# Patient Record
Sex: FEMALE
[De-identification: names, ages, dates, MRNs, and addresses within clinical notes are randomized per-mention and may not be internally consistent; named-entity substitution may affect disease eponyms.]

---

## 2017-03-24 ENCOUNTER — HOSPITAL ENCOUNTER (EMERGENCY)
Dept: HOSPITAL 42 - ED | Age: 67
Discharge: HOME | End: 2017-03-24
Payer: COMMERCIAL

## 2017-03-24 VITALS — TEMPERATURE: 97.4 F

## 2017-03-24 VITALS — HEART RATE: 90 BPM | DIASTOLIC BLOOD PRESSURE: 70 MMHG | SYSTOLIC BLOOD PRESSURE: 130 MMHG

## 2017-03-24 VITALS — RESPIRATION RATE: 16 BRPM | OXYGEN SATURATION: 98 %

## 2017-03-24 VITALS — BODY MASS INDEX: 42 KG/M2

## 2017-03-24 DIAGNOSIS — Z23: ICD-10-CM
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
## 2017-03-24 DIAGNOSIS — S01.01XA: Primary | ICD-10-CM
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
## 2017-03-24 DIAGNOSIS — E11.9: ICD-10-CM

## 2017-03-24 NOTE — ED PDOC
Arrival/HPI





- General


Chief Complaint: Trauma


Time Seen by Provider: 17 19:45


Historian: Patient





- History of Present Illness


Narrative History of Present Illness (Text): 


17 21:15


66 year old female on aspirin presents to the Emergency department s/p 

mechanical fall 4 hours PTA. Patient reports she tripped and fell, hitting the 

back of her head. She notes posterior head laceration. She denies loss of 

consciousness, vomiting, or headache. 








Time/Duration: 4-6 hours


Symptom Onset: Sudden


Activities at Onset: Rest


Context: Tripped





Past Medical History





- Provider Review


Nursing Documentation Reviewed: Yes





- Infectious Disease


Hx of Infectious Diseases: None





- Tetanus Immunization


Tetanus Immunization: Unknown





- Cardiac


Hx Cardiac Disorders: No


Hx Pacemaker: No





- Pulmonary


Hx Respiratory Disorders: No





- Neurological


Hx Neurological Disorder: No


Hx Paralysis: No





- HEENT


Hx HEENT Disorder: No





- Renal


Hx Renal Disorder: No





- Endocrine/Metabolic


Hx Endocrine Disorders: Yes


Hx Diabetes Mellitus Type 2: Yes





- Hematological/Oncological


Hx Blood Disorders: No


Hx Blood Transfusions: No


Hx Blood Transfusion Reaction: No


Hx Cancer: Yes (history of left breast)





- Integumentary


Hx Dermatological Disorder: No





- Musculoskeletal/Rheumatological


Hx Musculoskeletal Disorders: Yes (RA)





- Gastrointestinal


Hx Gastrointestinal Disorders: Yes


Hx Gastroesophageal Reflux: Yes





- Genitourinary/Gynecological


Hx Genitourinary Disorders: Yes


Other/Comment: breast ca





- Psychiatric


Hx Anxiety: Yes


Hx Depression: Yes


Hx Substance Use: No





- Surgical History


Hx Appendectomy: Yes


Hx  Section: Yes


Hx Musculoskeletal Surgery: Yes (left shoulder tumor removal)


Other/Comment: left breast lumpectomy





- Anesthesia


Hx Anesthesia Reactions: No


Hx Malignant Hyperthermia: No





- Suicidal Assessment


Feels Threatened In Home Enviroment: No





Family/Social History





- Physician Review


Nursing Documentation Reviewed: Yes


Family/Social History: Unknown Family HX


Smoking Status: Never Smoked


Hx Alcohol Use: No


Hx Substance Use: No


Hx Substance Use Treatment: No





Allergies/Home Meds


Allergies/Adverse Reactions: 


Allergies





ciprofloxacin [From Cipro] Adverse Reaction (Verified 17 19:10)


 VOMITING


ciprofloxacin HCl [From Cipro] Adverse Reaction (Verified 17 19:10)


 VOMITING


codeine Adverse Reaction (Verified 17 19:10)


 VOMITING








Home Medications: 


 Home Meds











 Medication  Instructions  Recorded  Confirmed


 


Aspirin [Aspir 81] 81 mg PO DAILY 14


 


Celecoxib [Celebrex] 200 mg PO DAILY 14


 


DULoxetine [Cymbalta] 60 mg PO BID 14


 


Etanercept [Enbrel] 50 mg PO WED 14


 


Folic Acid 1 mg PO DAILY 14


 


Lorazepam [Ativan] 1 mg PO TID 14


 


Rosuvastatin Calcium [Crestor] 10 mg PO QAM 14


 


Vitamin D 1,000 iu PO QPM 14


 


Evening Primrose Oil 2 tab PO HS 11/23/15 03/24/17


 


Glipizide [Glipizide ER] 2.5 mg PO QAM 11/23/15 03/24/17


 


Methotrexate 2.5 mg PO WED 11/23/15 03/24/17


 


Ranitidine HCl [Zantac 150] 150 mg PO BID 16














Physical Exam





- Physical Exam


Narrative Physical Exam (Text): 


17 21:21


- Review of Systems





Constitutional: Normal.  absent: LOC, Fatigue, Weight Change, Fevers


Eyes: Normal


ENT: Normal


Respiratory: Normal  absent: SOB, Cough, Sputum


Cardiovascular: Normal absent: Chest pain, Palpitations, Syncope


Gastrointestinal: Normal absent: Abdominal pain, Diarrhea, Nausea, Vomiting


Genitourinary: Normal.  absent: Dysuria, Frequency, Hematuria


Musculoskeletal: Normal.  absent: Arthralgias, Back Pain, Neck Pain


Skin: laceration to posterior head


Neurological: Normal absent: Headache, Focal Weakness


Endocrine: Normal


Hemo/Lymphatic: Normal


Psychiatric: Normal





- Physical exam





1.5 cm posterior occipital laceration, no active bleeding, 4mm in depth. No 

nasal bone deformity or tenderness, no facial or jaw pain/swelling. No neck 

midline tenderness, thoracic and lumbar spine with no midline tenderness. Pt 

moving b/l upper and lower extremities without difficulty, 5/5 strength, with 

full active and passive ROM. Distal neurovasc fully intact. Abd soft/nt/ng, no 

hematomas, no peritoneal signs. Neg. pelvic rock. 





- Systems Exam





Pupils: Present: PERRL


Extroacular Muscles: Present: EOMI


Conjunctiva: Present: Normal


Mouth: Present: Moist Mucous Membranes


Neck: Present: Normal Range of Motion.  No: MIDLINE TENDERNESS, Paraspinal 

Tenderness


Respiratory/Chest: Present: Clear to Auscultation, Good Air Exchange.  No: 

Respiratory Distress, Accessory Muscle Use, Tachypneic


Cardiovascular: Present: Regular Rate and Rhythm, Normal S1, S2, Peripheal 

Pulses Present.  No: Murmurs


Abdomen: Present: Normal Bowel Sounds.  No: Tenderness, Distention, Peritoneal 

Signs, Rebound, Guarding


Back: Present: Normal Inspection.  No: Midline Tenderness, Paraspinal Tenderness


Upper Extremity: Present: Normal Inspection.  No: Cyanosis, Edema


Lower Extremity: Present: Normal Inspection.  No: Edema


Neurological: Present: GCS=15, Speech Normal, cranial nerves II through XII 

fully intact with no cerebellar abnormality, neurosensory fully intact. No 

focal neurological deficits.


Skin: Present: Warm, Dry, Normal Color.  No: Rashes


Lymphatic: Present: OX3, NI, NC


Psychiatric: Present: Alert, Oriented x 3, Normal Insight, Normal Concentration


Vital Signs Reviewed: Yes


Vital Signs











  Temp Pulse Resp BP Pulse Ox


 


 17 19:13  97.4 F L  104 H  19  108/73  99











Temperature: Afebrile


Blood Pressure: Normal


Pulse: Regular


Respiratory Rate: Normal


Appearance: Positive for: Well-Appearing, Non-Toxic, Comfortable


Pain Distress: None


Mental Status: Positive for: Alert and Oriented X 3


Finger Stick Blood Glucose: 75





Medical Decision Making


ED Course and Treatment: 


17 21:23


Impression:


66 year old female s/p mechanical fall. Physical exam revealed 1.5 cm posterior 

occipital laceration, no active bleeding, 4mm in depth. 





Plan:


--CT Head


--TDAP Vaccine


-- Reassess and disposition





Progress Notes:








17 21:26


PROCEDURE: LACERATION REPAIR


Performed by the emergency provider


Location: posterior occipital


Length: 1.5 cm


Description: clean wound edges, no foreign bodies


Distal CMS: Normal. No deficits. Neurovascularly intact.


Preparation: The wound was cleaned and the area was prepped and draped in the 

usual sterile fashion.


Exploration: The wound was explored and no foreign bodies were found.


Procedure: The wound was closed with staples. There was good approximation.


Post-Procedure: Good closure and hemostasis. The patient tolerated the 

procedure well and there were no complications. 





CT Head pending. Patient in no distress.





EKG:


Ordered, reviewed, and independently interpreted the EKG.


Rate :  67 BPM


Rhythm : NSR


Interpretation : No ST-segment elevations, normal axis, normal intervals. 

Interpreted by me.


Comparison : No previous EKG for comparison.








17 21:36


IMPRESSION:


No acute intracranial findings. Right suboccipital scalp hematoma/contusion 

with extension to the


occipital scalp with minimal subcutaneous gas which is thought to relate to a 

left midline occipital


laceration.


Dictated and Authenticated by: Jeramie Meyer MD





Patient's has no focal neurological deficits on reevaluation. She denies having 

a headache. States that she feels comfortable being discharged home with 

outpatient follow-up.





Pt states she understands to return to the ER right away for new or worsening 

symptoms or for inability to f/u with PMD or specialist as instructed. 





Patient states that she fully agrees with and understands discharge 

instructions. States that she agrees with the plan and disposition. Verbalized 

and repeated discharge instructions and plan. I have given the patient 

opportunity to ask any additional questions.





- RAD Interpretation


Radiology Orders: 








17 19:46


HEAD W/O CONTRAST [CT] Stat 














- Medication Orders


Current Medication Orders: 











Discontinued Medications





Tetanus/Reduced Diphtheria/Acell Pertussis (Boostrix Vaccine Inj)  0.5 ml IM 

.ONCE ONE


   Stop: 17 19:46


   Last Admin: 17 19:56  Dose: 0.5 ML





MAR Immunization Data


 Document     17 19:56  SF  (Rec: 17 19:57  SF  [facility])


     Immunization Data


      Vaccine Lot Number                         [id number]


      Vaccine Expiration Date                    19


      Site Given                                 Left Deltoid


      Route                                      Intramuscular














- Scribe Statement


The provider has reviewed the documentation as recorded by the Scribe


Alem Milton





Provider Scribe Attestation:


All medical record entries made by the Scribe were at my direction and 

personally dictated by me. I have reviewed the chart and agree that the record 

accurately reflects my personal performance of the history, physical exam, 

medical decision making, and the department course for this patient. I have 

also personally directed, reviewed, and agree with the discharge instructions 

and disposition.








Disposition/Present on Arrival





- Present on Arrival


Any Indicators Present on Arrival: No


History of DVT/PE: No


History of Uncontrolled Diabetes: Yes


Urinary Catheter: No


History of Decub. Ulcer: No


History Surgical Site Infection Following: None





- Disposition


Have Diagnosis and Disposition been Completed?: Yes


Diagnosis: 


 Head injury


Disposition: HOME/ ROUTINE


Disposition Time: 21:38


Patient Plan: Discharge


Condition: GOOD


Discharge Instructions (ExitCare):  Head Injury (ED), Fall Prevention for Older 

Adults (ED)


Additional Instructions: 


PLEASE RETURN TO THE EMERGENCY DEPARTMENT FOR NEW OR WORSENING SYMPTOMS. RETURN 

RIGHT AWAY IF YOU CANNOT FOLLOW UP WITH YOUR PRIMARY CARE DOCTOR, CLINIC, OR 

SPECIALIST IN 1-2 DAYS. 


PLEASE RETURN TO THE ER OR YOUR PRIMARY PHYSICIAN IN 7 DAYS FOR STAPLE REMOVAL


KEEP WOUND CLEAN AND DRY


APPLY ANTI-BACTERIAL OINTMENT TWICE A DAY (over the counter per pharmacy 

instructions)


Please take over-the-counter Tylenol for pain

## 2017-03-25 NOTE — CT
PROCEDURE:  CT HEAD WITHOUT CONTRAST.



HISTORY:

Fall



COMPARISON:

None available. 



TECHNIQUE:

Axial computed tomography images were obtained through the head/brain 

without intravenous contrast.  



Radiation dose:



Total exam DLP = 725.84 mGy-cm.



FINDINGS:



HEMORRHAGE:

No intracranial hemorrhage. 



BRAIN:

There are mild chronic microangiopathic changes.  There is no mass, 

mass effect or abnormal extra-axial fluid collection.  There is 

normal density in the larger dural venous sinuses. 



VENTRICLES:

There is mild age-related global parenchymal volume loss and 

proportionate enlargement of the ventricles and cortical sulci. 



CALVARIUM:

There is no calvarial fracture. There is a moderate right 

parieto-occipital scalp hematoma and left parietal laceration. . 



PARANASAL SINUSES:

Predominantly clear.



MASTOID AIR CELLS:

Predominantly clear.



OTHER FINDINGS:

None.



IMPRESSION:

No acute intracranial abnormality. 



Moderate right parieto-occipital scalp hematoma. 



A preliminary report was provided by [facility] services.

## 2017-04-01 ENCOUNTER — HOSPITAL ENCOUNTER (EMERGENCY)
Dept: HOSPITAL 42 - ED | Age: 67
Discharge: HOME | End: 2017-04-01
Payer: MEDICARE

## 2017-04-01 VITALS — RESPIRATION RATE: 16 BRPM | OXYGEN SATURATION: 98 %

## 2017-04-01 VITALS — HEART RATE: 109 BPM | SYSTOLIC BLOOD PRESSURE: 135 MMHG | TEMPERATURE: 97.9 F | DIASTOLIC BLOOD PRESSURE: 80 MMHG

## 2017-04-01 VITALS — BODY MASS INDEX: 38.5 KG/M2
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
## 2017-04-01 DIAGNOSIS — S62.92XA: Primary | ICD-10-CM

## 2017-04-01 DIAGNOSIS — E11.9: ICD-10-CM

## 2017-04-01 NOTE — ED PDOC
Arrival/HPI





- General


Time Seen by Provider: 17 18:22


Historian: Patient





- History of Present Illness


Narrative History of Present Illness (Text): 





17 18:22


Patient reports injuring her L hand when she fell 6 days ago. Presents to the 

ER today due to continued pain, swelling and bruising. Otherwise:  (-) other 

injury, (-) numbness.  





SUSANA Gallo





Past Medical History





- Provider Review


Nursing Documentation Reviewed: Yes





- Infectious Disease


Hx of Infectious Diseases: None





- Tetanus Immunization


Tetanus Immunization: Unknown





- Cardiac


Hx Cardiac Disorders: No


Hx Pacemaker: No





- Pulmonary


Hx Respiratory Disorders: No





- Neurological


Hx Neurological Disorder: No


Hx Paralysis: No





- HEENT


Hx HEENT Disorder: No





- Renal


Hx Renal Disorder: No





- Endocrine/Metabolic


Hx Endocrine Disorders: Yes


Hx Diabetes Mellitus Type 2: Yes





- Hematological/Oncological


Hx Blood Disorders: No


Hx Blood Transfusions: No


Hx Blood Transfusion Reaction: No


Hx Cancer: Yes (history of left breast)





- Integumentary


Hx Dermatological Disorder: No





- Musculoskeletal/Rheumatological


Hx Musculoskeletal Disorders: Yes (RA)





- Gastrointestinal


Hx Gastrointestinal Disorders: Yes


Hx Gastroesophageal Reflux: Yes





- Genitourinary/Gynecological


Hx Genitourinary Disorders: Yes


Other/Comment: breast ca





- Psychiatric


Hx Anxiety: Yes


Hx Depression: Yes


Hx Substance Use: No





- Surgical History


Hx Appendectomy: Yes


Hx  Section: Yes


Hx Musculoskeletal Surgery: Yes (left shoulder tumor removal)


Other/Comment: left breast lumpectomy





- Anesthesia


Hx Anesthesia Reactions: No


Hx Malignant Hyperthermia: No





- Suicidal Assessment


Feels Threatened In Home Enviroment: No





Family/Social History





- Physician Review


Nursing Documentation Reviewed: Yes


Family/Social History: No Known Family HX


Smoking Status: Never Smoked


Hx Alcohol Use: No


Hx Substance Use: No


Hx Substance Use Treatment: No





Allergies/Home Meds


Allergies/Adverse Reactions: 


Allergies





ciprofloxacin [From Cipro] Adverse Reaction (Verified 17 18:33)


 VOMITING


codeine Adverse Reaction (Verified 17 18:33)


 VOMITING








Home Medications: 


 Home Meds











 Medication  Instructions  Recorded  Confirmed


 


Aspirin [Aspir 81] 81 mg PO DAILY 14


 


Celecoxib [Celebrex] 200 mg PO DAILY 14


 


DULoxetine [Cymbalta] 60 mg PO BID 14


 


Etanercept [Enbrel] 50 mg PO WED 14


 


Folic Acid 1 mg PO DAILY 14


 


Lorazepam [Ativan] 1 mg PO TID 14


 


Rosuvastatin Calcium [Crestor] 10 mg PO QAM 14


 


Vitamin D 1,000 iu PO QPM 14


 


Evening Primrose Oil 2 tab PO HS 11/23/15 03/24/17


 


Glipizide [Glipizide ER] 2.5 mg PO QAM 11/23/15 03/24/17


 


Methotrexate 2.5 mg PO WED 11/23/15 03/24/17


 


Ranitidine HCl [Zantac 150] 150 mg PO BID 16














Review of Systems





- Review of Systems


Constitutional: Normal.  absent: Fatigue, Fevers


Musculoskeletal: Normal, Arthralgias.  absent: Back Pain


Skin: Normal.  absent: Rash, Pruritis, Skin Lesions





Physical Exam





- Physical Exam


Narrative Physical Exam (Text): 





17 18:24


GENERAL APPEARANCE: Patient is awake, alert, oriented x 3, in no acute 

distress. 


SKIN: Warm, (-) rash, (-) lesions. 


UPPER EXTREMITY:  (+) Mild tenderness, swelling, and ecchymosis of  dorsal 

aspect of the L hand over the 3rd-4th-5th metacarpal; (-) crepitus, (-) 

deformity.  Tendon function intact.  (-) distal neurovascular deficit.  2 point 

discrimination.  Remainder of hand, digits and wrist:  (-) injury.


Vital Signs











  Temp Pulse Resp BP Pulse Ox


 


 17 18:27  97.9 F  109 H  20  135/80  94 L














Medical Decision Making


ED Course and Treatment: 


17 18:24


67 yo F presents with L hand injury 6 days ago. XR L hand ordered. Patient is 

refusing any analgesic medications at this time. 





XR L hand: fracture to the 5th metacarpal head, no dislocation, as read by PA


Patient advised that official radiology read of XR is still pending and will 

call the patient if there is any discrepancy within 24 hours.  





XR results discussed with the patient in great detail. Orthoglass ulnar gutter 

splint applied to the hand by PA, neurovascular intact post splint application. 

Based on history, exam and diagnostic results plan will be for outpatient follow

-up. Patient states she fully agrees with and understands discharge 

instructions. States that she agrees with the plan and disposition. Verbalized 

and repeated discharge instructions and plan. I have given the patient 

opportunity to ask any additional questions. 





Follow up with ortho and plastics referral in 1-2 days without fail. Return to 

the emergency room at any time for any new or worsening symptoms.











- RAD Interpretation


Radiology Orders: 








17 18:33


HAND LEFT 3 VIEWS ROUTINE [RAD] Stat 














- PA / NP / Resident Statement


MD/DO has reviewed & agrees with the documentation as recorded.





Disposition/Present on Arrival





- Present on Arrival


Any Indicators Present on Arrival: Yes


History of DVT/PE: No


History of Uncontrolled Diabetes: Yes


Urinary Catheter: No


History Surgical Site Infection Following: None





- Disposition


Have Diagnosis and Disposition been Completed?: Yes


Diagnosis: 


 Hand fracture, left


Disposition: HOME/ ROUTINE


Disposition Time: 19:00


Patient Plan: Discharge


Condition: STABLE


Discharge Instructions (ExitCare):  Hand Fracture (ED)


Print Language: ENGLISH


Additional Instructions: 


Thank you for letting us take care of you today. You were treated for L hand 

fracture. The emergency medical care you received today was directed at your 

acute symptoms. Return to the Emergency Department if your symptoms worsen, do 

not improve, or if you have any other problems.





Please contact your doctor in 2 days for re-evaluation and follow up / or call 

one of the physicians/clinics you have been referred to that are listed on the 

Patient Visit Information form that is included in your discharge packet. Bring 

any paperwork you were given at discharge with you along with any medications 

you are taking to your follow up visit. Our treatment cannot replace ongoing 

medical care by a primary care provider (PCP) outside of the emergency 

department.





Thank you for allowing the [facility] team to be part of your care today.








Referrals: 


Ryan Gallo MD [Primary Care Provider] - Follow up with primary


Jeramie Vazquez III, MD [Medical Doctor] - Follow up with primary


Azeem Arriaga MD [Staff Provider] - Follow up with primary

## 2017-04-02 NOTE — RAD
PROCEDURE:  Left Hand Radiographs.



HISTORY:

 pain 



COMPARISON:

None.



FINDINGS:



BONES:

Normal. No fracture. 



JOINTS:

Normal. No osteoarthritic changes. 



SOFT TISSUES:

Normal. 



OTHER FINDINGS:

None.



IMPRESSION:

Normal left hand radiographs.

## 2018-02-13 ENCOUNTER — HOSPITAL ENCOUNTER (OUTPATIENT)
Dept: HOSPITAL 42 - SDS | Age: 68
Discharge: HOME | End: 2018-02-13
Attending: ORTHOPAEDIC SURGERY
Payer: MEDICARE

## 2018-02-13 VITALS — BODY MASS INDEX: 39.9 KG/M2

## 2018-02-13 VITALS — TEMPERATURE: 98.2 F | DIASTOLIC BLOOD PRESSURE: 68 MMHG | SYSTOLIC BLOOD PRESSURE: 119 MMHG | HEART RATE: 98 BPM

## 2018-02-13 VITALS — OXYGEN SATURATION: 96 %

## 2018-02-13 VITALS — RESPIRATION RATE: 18 BRPM

## 2018-02-13 DIAGNOSIS — M75.42: ICD-10-CM

## 2018-02-13 DIAGNOSIS — M75.102: Primary | ICD-10-CM

## 2018-02-13 DIAGNOSIS — S46.212A: ICD-10-CM

## 2018-02-13 LAB
AMORPH SED URNS QL MICRO: (no result)
APPEARANCE UR: (no result)
BACTERIA #/AREA URNS HPF: (no result) /[HPF]
BILIRUB UR-MCNC: NEGATIVE MG/DL
COLOR UR: YELLOW
GLUCOSE UR STRIP-MCNC: NEGATIVE MG/DL
LEUKOCYTE ESTERASE UR-ACNC: (no result) LEU/UL
PH UR STRIP: 5.5 [PH]
PROT UR STRIP-MCNC: NEGATIVE MG/DL
RBC # UR STRIP: NEGATIVE /UL
RBC #/AREA URNS HPF: (no result) /HPF
SP GR UR STRIP: 1.02
URINE NITRATE: POSITIVE
UROBILINOGEN UR STRIP-ACNC: 0.2 E.U./DL
WBC #/AREA URNS HPF: (no result) /HPF

## 2018-02-13 PROCEDURE — 29826 SHO ARTHRS SRG DECOMPRESSION: CPT

## 2018-02-13 PROCEDURE — 29827 SHO ARTHRS SRG RT8TR CUF RPR: CPT

## 2018-02-13 PROCEDURE — 29828 SHO ARTHRS SRG BICP TENODSIS: CPT

## 2018-02-13 PROCEDURE — 82948 REAGENT STRIP/BLOOD GLUCOSE: CPT

## 2018-02-13 PROCEDURE — 81001 URINALYSIS AUTO W/SCOPE: CPT

## 2018-02-13 NOTE — OP
PROCEDURE DATE:  02/13/2018



PREOPERATIVE DIAGNOSES:  Left shoulder rotator cuff tear and impingement.



POSTOPERATIVE DIAGNOSES:  Left shoulder rotator cuff tear, impingement and

biceps tendon tear.



PROCEDURE:  Left shoulder arthroscopy with arthroscopic rotator cuff

repair, subacromial decompression and biceps tenotomy.



SURGEON:  Brown Gamboa Md.



ASSISTANT:  Dr. Gamboa was assisted by Bibi Lopez, physician

assistant.  Ms. Lopez was scrubbed and present throughout the entire case

and helped with patient positioning, holding the arthroscope during the

repair as well as wound closure.



ANESTHESIA:  General.



COMPLICATIONS:  None.



ESTIMATED BLOOD LOSS:  10 mL.



OPERATIVE INDICATIONS:  This is a 67-year-old female, who presented with

complaints of left shoulder pain.  Clinical examination was consistent with

pain with resisted abduction and forward flexion, positive Almeida sign. 

Patient was noted to have some weakness with forward flexion.  CT with

intraarticular contrast examination was consistent with what looked like a

near to full thickness tear of the supraspinatus.  After a period of failed

nonsurgical management, recommendation was for a left shoulder arthroscopy.

The risks, benefits, and alternatives of the procedure were discussed with

the patient and informed consent was obtained.



OPERATIVE PROCEDURE:  After the surgical site was signed and verified in

the preoperative holding area, the patient was taken to the operating room

and placed supine on the operating room table.  After the patient received

1 g of Rocephin IV, patient was positioned in the lateral decubitus

position with the left shoulder up towards the ceiling.  Care was taken to

make sure all bony prominences and nerves were well padded and protected. 

An axillary roll was placed in the right axilla.  Venodyne boots were

placed on the bilateral lower extremities and the left upper extremity was

prepped and draped in the usual sterile fashion.  Bony landmarks were

identified about the left upper extremity and the portal sites were

injected with a total of 10 mL of 1% lidocaine with epi.  Posterior

arthroscopy portal was established and arthroscope was inserted into the

glenohumeral joint.  The chondral surface of the glenohumeral head were

noted to have some grade I changes.  The biceps tendon was also visualized

and appeared to have at least a 50% tear with some fraying of the biceps

noted distally at the level of the intertubercular groove.  At this point,

decision was made to perform a biceps tenotomy and this was done using the

electrocautery device and full radius shaver.  The anterior, inferior and

posterior labrum appeared to be intact.  No loose bodies were appreciated. 

The supraspinatus was noted to have significant articular sided fraying and

this was debrided using a full radius shaver.  Subscapularis was identified

and appeared to be intact.  At this point, the arthroscope was inserted

posteriorly into the subacromial space.  A lateral port was established and

a bursectomy was performed, which gave excellent visualization of the

undersurface of the acromion as well as the rotator cuff.  Satisfied that

the subacromial space have been adequately decompressed, our attention was

directed to the rotator cuff.  _____ a small, but full thickness tear at

the midsubstance of the supraspinatus tendon with some extension into

almost the footprint of the tear.  Decision was made to do margin

convergence repair and this was done by passing 4 sutures across the tear

and then tying it down arthroscopically.  Once this was done, the edges of

the tear were noted to be firmly apposed and the rotator cuff footprint

appeared to be intact.  At this point, all the instruments were removed and

all the portal sites were closed using interrupted 2-0 Vicryl suture and

3-0 nylon.  A sterile dressing was applied.  The left upper extremity was

_____ and placed in a sling.  Patient was awakened from anesthesia and

taken to the recovery room in stable condition.





__________________________________________

Brown aGmboa MD



DD:  02/13/2018 11:32:21

DT:  02/13/2018 21:52:16

Job # [id number]

## 2018-02-13 NOTE — PCM.[email or web address]
Surgeon's Initial Post Op Note





- Surgeon's Notes


Surgeon: CRISTIAN Gamboa MD


Assistant: YOVANY Hardy PA-C


Type of Anesthesia: General Endo


Anesthesia Administered By: Dr. Bess


Pre-Operative Diagnosis: Left shoulder rotator cuff tear


Operative Findings: see full note


Post-Operative Diagnosis: same


Operation Performed: Left shoulder arthroscopic rotator cuff repair, 

acromioplasty, biceps tenotomy


Specimen/Specimens Removed: none


Estimated Blood Loss: EBL {In ML}: 10


Blood Products Given: N/A


Drains Used: No Drains


Post-Op Condition: Fair


Date of Surgery/Procedure: 02/13/18


Time of Surgery/Procedure: 11:49





Results





- Vital Signs


Recent Vital Signs: 


 Last Vital Signs











Temp  98 F   02/13/18 11:32


 


Pulse  77   02/13/18 11:32


 


Resp  16   02/13/18 11:32


 


BP  106/66   02/13/18 11:32


 


Pulse Ox  99   02/13/18 11:32














- Labs


Labs: 


 Laboratory Results - last 24 hr











  02/13/18 02/13/18





  06:45 07:09


 


POC Glucose (mg/dL)   96


 


Urine Color  Yellow 


 


Urine Appearance  Slight-cloudy 


 


Urine pH  5.5 


 


Ur Specific Gravity  1.025 


 


Urine Protein  Negative 


 


Urine Glucose (UA)  Negative 


 


Urine Ketones  Negative 


 


Urine Blood  Negative 


 


Urine Nitrate  Positive H 


 


Urine Bilirubin  Negative 


 


Urine Urobilinogen  0.2 


 


Ur Leukocyte Esterase  Moderate H 


 


Urine RBC  0 - 2 


 


Urine WBC  20 - 25 


 


Ur Epithelial Cells  4 - 5 


 


Amorphous Sediment  Few 


 


Urine Bacteria  Many 














- Impressions


Impression: 





[place]  patient report reviewed, last tramadol 7/2017. Patient counseled on the 

risks of addiction, physical or psychological dependence, and overdose 

associated with opioid drugs and the danger of taking opioid drugs with alcohol 

and other central nervous system depressants, and cautioned patient on storage 

and disposal.

## 2018-06-23 ENCOUNTER — HOSPITAL ENCOUNTER (EMERGENCY)
Dept: HOSPITAL 42 - ED | Age: 68
Discharge: LEFT BEFORE DISCHARGE | End: 2018-06-23
Payer: MEDICARE

## 2018-06-23 VITALS — BODY MASS INDEX: 39.9 KG/M2

## 2018-06-23 VITALS
TEMPERATURE: 97.6 F | DIASTOLIC BLOOD PRESSURE: 81 MMHG | SYSTOLIC BLOOD PRESSURE: 133 MMHG | OXYGEN SATURATION: 100 % | HEART RATE: 76 BPM | RESPIRATION RATE: 18 BRPM

## 2018-06-23 DIAGNOSIS — M06.9: ICD-10-CM

## 2018-06-23 DIAGNOSIS — R07.9: Primary | ICD-10-CM

## 2018-06-23 DIAGNOSIS — E11.9: ICD-10-CM

## 2018-06-23 LAB
ALBUMIN SERPL-MCNC: 4.2 G/DL (ref 3–4.8)
ALBUMIN/GLOB SERPL: 1.3 {RATIO} (ref 1.1–1.8)
ALT SERPL-CCNC: 24 U/L (ref 7–56)
APPEARANCE UR: CLEAR
APTT BLD: 31.6 SECONDS (ref 25.1–36.5)
AST SERPL-CCNC: 21 U/L (ref 14–36)
BACTERIA #/AREA URNS HPF: (no result) /[HPF]
BASOPHILS # BLD AUTO: 0.06 K/MM3 (ref 0–2)
BASOPHILS NFR BLD: 0.6 % (ref 0–3)
BILIRUB UR-MCNC: NEGATIVE MG/DL
BNP SERPL-MCNC: 311 PG/ML (ref 0–450)
BUN SERPL-MCNC: 14 MG/DL (ref 7–21)
CALCIUM SERPL-MCNC: 9.1 MG/DL (ref 8.4–10.5)
COLOR UR: YELLOW
D DIMER PPP FEU-MCNC: 234 NG/ML (ref 0–243)
EOSINOPHIL # BLD: 0.3 10*3/UL (ref 0–0.7)
EOSINOPHIL NFR BLD: 3.1 % (ref 1.5–5)
ERYTHROCYTE [DISTWIDTH] IN BLOOD BY AUTOMATED COUNT: 15 % (ref 11.5–14.5)
GFR NON-AFRICAN AMERICAN: > 60
GLUCOSE UR STRIP-MCNC: NEGATIVE MG/DL
GRANULOCYTES # BLD: 6.35 10*3/UL (ref 1.4–6.5)
GRANULOCYTES NFR BLD: 60.6 % (ref 50–68)
HGB BLD-MCNC: 13.5 G/DL (ref 12–16)
INR PPP: 0.99 (ref 0.93–1.08)
LEUKOCYTE ESTERASE UR-ACNC: (no result) LEU/UL
LYMPHOCYTES # BLD: 3.1 10*3/UL (ref 1.2–3.4)
LYMPHOCYTES NFR BLD AUTO: 30 % (ref 22–35)
MCH RBC QN AUTO: 28.5 PG (ref 25–35)
MCHC RBC AUTO-ENTMCNC: 32.6 G/DL (ref 31–37)
MCV RBC AUTO: 87.5 FL (ref 80–105)
MONOCYTES # BLD AUTO: 0.6 10*3/UL (ref 0.1–0.6)
MONOCYTES NFR BLD: 5.7 % (ref 1–6)
PH UR STRIP: 6 [PH] (ref 4.7–8)
PLATELET # BLD: 275 10^3/UL (ref 120–450)
PMV BLD AUTO: 10.1 FL (ref 7–11)
PROT UR STRIP-MCNC: NEGATIVE MG/DL
PROTHROMBIN TIME: 11.3 SECONDS (ref 9.4–12.5)
RBC # BLD AUTO: 4.73 10^6/UL (ref 3.5–6.1)
RBC # UR STRIP: NEGATIVE /UL
RBC #/AREA URNS HPF: NEGATIVE /HPF (ref 0–2)
SP GR UR STRIP: 1.02 (ref 1–1.03)
TROPONIN I SERPL-MCNC: < 0.01 NG/ML
UROBILINOGEN UR STRIP-ACNC: 2 E.U./DL
WBC # BLD AUTO: 10.5 10^3/UL (ref 4.5–11)

## 2018-06-23 NOTE — ED PDOC
Arrival/HPI





- General


Historian: Patient





- History of Present Illness


Time/Duration: < week


Symptom Course: Unchanged


Quality: Tightness


Severity Level: 7


Activities at Onset: Rest





- General


Chief Complaint: Chest Pain


Time Seen by Provider: 06/23/18 19:51





- History of Present Illness


Narrative History of Present Illness (Text): 


67 year old female presents with non radiating, reproducible chest pain which 

began 3 days ago. Patient states she feels mid sternal tightness began 3 days 

ago, associated with shortness of breath, nausea, diaphoresis, and pain on 

inspiration. Patient denies abdominal pain, fver, chills, or any other 

complaints. Patient took her aspirin this AM. 


06/23/18 20:16


 (Jose Alfredo Sutton)





Past Medical History





- Provider Review


Nursing Documentation Reviewed: Yes





- Infectious Disease


Hx of Infectious Diseases: None





- Tetanus Immunization


Tetanus Immunization: Unknown





- Cardiac


Hx Cardiac Disorders: No





- Pulmonary


Hx Respiratory Disorders: No





- Neurological


Hx Neurological Disorder: No





- HEENT


Hx HEENT Disorder: No





- Renal


Hx Renal Disorder: No





- Endocrine/Metabolic


Hx Endocrine Disorders: Yes


Hx Diabetes Mellitus Type 2: Yes





- Hematological/Oncological


Hx Blood Disorders: Yes


Hx Cancer: Yes





- Integumentary


Hx Dermatological Disorder: No





- Musculoskeletal/Rheumatological


Hx Musculoskeletal Disorders: Yes


Hx Rheumatoid Arthritis: Yes





- Gastrointestinal


Hx Gastrointestinal Disorders: Yes


Hx Gastroesophageal Reflux: Yes





- Genitourinary/Gynecological


Hx Genitourinary Disorders: No





- Psychiatric


Hx Psychophysiologic Disorder: Yes


Hx Anxiety: Yes


Hx Substance Use: No





- Surgical History


Hx Appendectomy: Yes


Hx Orthopedic Surgery: Yes (L SHOULDER)


Other/Comment: LUMPECTOMY





- Anesthesia


Hx Anesthesia Reactions: No


Hx Malignant Hyperthermia: No





- Suicidal Assessment


Feels Threatened In Home Enviroment: No





Family/Social History





- Physician Review


Nursing Documentation Reviewed: Yes


Family/Social History: No Known Family HX


Smoking Status: Never Smoked


Hx Alcohol Use: No


Hx Substance Use: No


Hx Substance Use Treatment: No





Allergies/Home Meds


Allergies/Adverse Reactions: 


Allergies





ciprofloxacin [From Cipro] Adverse Reaction (Severe, Verified 06/23/18 19:48)


 VOMITING


codeine Adverse Reaction (Severe, Verified 06/23/18 19:48)


 VOMITING








Home Medications: 


 Home Meds











 Medication  Instructions  Recorded  Confirmed


 


Aspirin [Aspir 81] 81 mg PO DAILY 02/03/14 06/23/18


 


DULoxetine [Cymbalta] 60 mg PO BID 02/03/14 06/23/18


 


Lorazepam [Ativan] 1 mg PO TID 02/03/14 06/23/18


 


Rosuvastatin Calcium [Crestor] 10 mg PO QAM 02/03/14 06/23/18


 


Glipizide [Glipizide ER] 10 mg PO BID 11/23/15 06/23/18


 


Celecoxib [Celebrex] 200 mg PO DAILY 01/09/18 06/23/18


 


Cholecalciferol [Vitamin D] 1,000 iu PO DAILY 01/09/18 06/23/18


 


Etanercept [Enbrel] 50 mg SC WED 01/09/18 06/23/18


 


Evening Primrose Oil 2 cap PO DAILY 01/09/18 06/23/18


 


Folic Acid 1 mg PO DAILY 01/09/18 06/23/18


 


Insulin Degludec [Tresiba 10 unit SQ QAM 01/09/18 06/23/18





Flextouch U-100]   


 


Linagliptin [Tradjenta] 5 mg PO DAILY 01/09/18 06/23/18


 


Methotrexate 6 tab PO WED 01/09/18 06/23/18














Review of Systems





- Review of Systems


Constitutional: Normal.  absent: Fevers


Eyes: Normal.  absent: Vision Changes


ENT: Normal


Respiratory: SOB.  absent: Sputum, Wheezing


Cardiovascular: Chest Pain.  absent: Palpitations, Syncope


Gastrointestinal: Nausea.  absent: Abdominal Pain, Vomiting


Musculoskeletal: Normal


Skin: Normal


Neurological: Normal.  absent: Headache, Dizziness, Focal Weakness


Endocrine: Diaphoresis


Hemo/Lymphatic: Normal


Psychiatric: Normal





Physical Exam


Vital Signs Reviewed: Yes


Temperature: Afebrile


Blood Pressure: Normal


Pulse: Regular


Respiratory Rate: Normal


Appearance: Positive for: Well-Appearing, Non-Toxic, Comfortable


Pain Distress: Mild


Mental Status: Positive for: Alert and Oriented X 3





- Systems Exam


Head: Present: Atraumatic, Normocephalic


Pupils: Present: PERRL


Extroacular Muscles: Present: EOMI


Conjunctiva: Present: Normal


Mouth: Present: Moist Mucous Membranes


Respiratory/Chest: Present: Clear to Auscultation, Good Air Exchange.  No: 

Wheezes, Rales, Rhonchi


Cardiovascular: Present: Regular Rate and Rhythm, Normal S1, S2, Peripheal 

Pulses Present.  No: Murmurs, Tachycardic


Abdomen: Present: Normal Bowel Sounds.  No: Tenderness, Distention


Upper Extremity: No: Edema


Lower Extremity: No: Edema


Neurological: Present: GCS=15


Skin: Present: Warm, Dry


Vital Signs











  Temp Pulse Resp BP Pulse Ox


 


 06/23/18 20:07  97.6 F  76  18  133/81  100














Medical Decision Making





- Lab Interpretations


I have reviewed the lab results: Yes


ED Course and Treatment: 


06/23/18 21:50


Patient Seen With Resident:


In agreement with resident note which contains more details about the patient. 

Patient was seen and evaluated with resident. Came up with plan and treatment 

together.. 








The patient declines admission, and wishes to leave the Emergency Department.  

This action is against my medical advice to the patient and the decision was 

made with informed refusal. The patient was told that admission is necessary 

and a full explanation of the rationale was given.  The risks of leaving were 

explained to the patient and include, but are not limited to, worsening of 

known or currently unknown conditions, permanent disability and death from 

undiagnosed or untreated conditions  The patient has the capacity to make this 

informed decision and understands the clinical situation and my explanation of 

the risks of leaving. The patient voluntarily accepts these risks, and a signed 

AMA form documenting our conversation was obtained. The patient was given the 

opportunity to ask questions and reconsider.  The patient was encouraged to 

return to the Emergency Department at any time for further care.


 (Ryan Bronson)


Plan


-EKG, Chest Xray, ddimer


-Magnesium, CBC, CMP, cardiac ISO


-coags 


-reasses


06/23/18 20:21





Patient wants to sign out AMA, risks were discussed and patient signed out AMA 

paperwork 


06/23/18 21:51


 (Jose Alfredo Sutton)





- Lab Interpretations


Lab Results: 








 06/23/18 19:55 





 06/23/18 19:55 





 Lab Results





06/23/18 20:25: Urine Color Yellow, Urine Appearance Clear, Urine pH 6.0, Ur 

Specific Gravity 1.020, Urine Protein Negative, Urine Glucose (UA) Negative, 

Urine Ketones Negative, Urine Blood Negative, Urine Nitrate Negative, Urine 

Bilirubin Negative, Urine Urobilinogen 2.0 H, Ur Leukocyte Esterase Small H, 

Urine RBC Negative, Urine WBC 2 - 5, Ur Epithelial Cells 1 - 3, Urine Bacteria 

Few


06/23/18 19:55: APTT 31.6, D-Dimer, Quantitative 234


06/23/18 19:55: Sodium 143, Potassium 3.6, Chloride 103, Carbon Dioxide 28, 

Anion Gap 16, BUN 14, Creatinine 0.6 L, Est GFR ( Amer) > 60, Est GFR (

Non-Af Amer) > 60, Random Glucose 79, Calcium 9.1, Magnesium 1.9, Total 

Bilirubin 0.5, AST 21, ALT 24, Alkaline Phosphatase 65, Lactate Dehydrogenase 

387, Total Creatine Kinase 42, Troponin I < 0.01, NT-Pro-B Natriuret Pep 311, 

Total Protein 7.3, Albumin 4.2, Globulin 3.1, Albumin/Globulin Ratio 1.3


06/23/18 19:55: WBC 10.5  D, RBC 4.73, Hgb 13.5, Hct 41.4, MCV 87.5  D, MCH 28.5

, MCHC 32.6, RDW 15.0 H, Plt Count 275, MPV 10.1, Gran % 60.6, Lymph % (Auto) 

30.0, Mono % (Auto) 5.7, Eos % (Auto) 3.1, Baso % (Auto) 0.6, Gran # 6.35, 

Lymph # (Auto) 3.1, Mono # (Auto) 0.6, Eos # (Auto) 0.3, Baso # (Auto) 0.06











- RAD Interpretation


Radiology Orders: 








06/23/18 20:09


CHEST PORTABLE [RAD] Stat 














Disposition/Present on Arrival





- Present on Arrival


Any Indicators Present on Arrival: No


History of DVT/PE: No


History of Uncontrolled Diabetes: Yes


Urinary Catheter: No


History of Decub. Ulcer: No


History Surgical Site Infection Following: None





- Disposition


Have Diagnosis and Disposition been Completed?: Yes


Disposition Time: 21:53





- Disposition


Diagnosis: 


 Chest pain





Disposition: AGAINST MEDICAL ADVICE


Condition: FAIR


Discharge Instructions (ExitCare):  Chest Pain (ED)


Referrals: 


Lupillo Peraza MD [Primary Care Provider] - Follow up with primary


Forms:  [employer] (English)

## 2018-06-24 NOTE — RAD
HISTORY:

chest pain  



COMPARISON:

Comparison chest 01/09/2018 



FINDINGS:



LUNGS:

Minor bibasilar atelectasis 



PLEURA:

No significant pleural effusion identified, no pneumothorax apparent.



CARDIOVASCULAR:

Normal.



OSSEOUS STRUCTURES:

. Old fracture deformity left distal clavicle.  ORIF hardware also 

seen within the left humeral head and proximal shaft 



VISUALIZED UPPER ABDOMEN:

Normal.



OTHER FINDINGS:

None.



IMPRESSION:

Minor bibasilar atelectasis
100

## 2018-06-24 NOTE — CARD
--------------- APPROVED REPORT --------------





EKG Measurement

Heart [id number]

PA [id number]

[id number]

[id number]

[id number]



<Conclusion>

Normal sinus rhythm

Minimal voltage criteria for LVH, may be normal variant

Borderline ECG

## 2019-03-16 ENCOUNTER — HOSPITAL ENCOUNTER (OUTPATIENT)
Dept: HOSPITAL 42 - ED | Age: 69
Setting detail: OBSERVATION
LOS: 1 days | Discharge: HOME | End: 2019-03-17
Attending: INTERNAL MEDICINE | Admitting: INTERNAL MEDICINE
Payer: MEDICARE

## 2019-03-16 VITALS — BODY MASS INDEX: 37.1 KG/M2

## 2019-03-16 VITALS — OXYGEN SATURATION: 95 %

## 2019-03-16 DIAGNOSIS — Z92.3: ICD-10-CM

## 2019-03-16 DIAGNOSIS — K76.0: ICD-10-CM

## 2019-03-16 DIAGNOSIS — Z87.891: ICD-10-CM

## 2019-03-16 DIAGNOSIS — F32.9: ICD-10-CM

## 2019-03-16 DIAGNOSIS — Z85.3: ICD-10-CM

## 2019-03-16 DIAGNOSIS — M06.9: ICD-10-CM

## 2019-03-16 DIAGNOSIS — E11.9: ICD-10-CM

## 2019-03-16 DIAGNOSIS — Z79.4: ICD-10-CM

## 2019-03-16 DIAGNOSIS — I20.9: Primary | ICD-10-CM

## 2019-03-16 DIAGNOSIS — M17.10: ICD-10-CM

## 2019-03-16 DIAGNOSIS — K59.00: ICD-10-CM

## 2019-03-16 DIAGNOSIS — E55.9: ICD-10-CM

## 2019-03-16 DIAGNOSIS — K21.9: ICD-10-CM

## 2019-03-16 DIAGNOSIS — F41.9: ICD-10-CM

## 2019-03-16 DIAGNOSIS — E78.5: ICD-10-CM

## 2019-03-16 DIAGNOSIS — R07.89: ICD-10-CM

## 2019-03-16 LAB
ALBUMIN SERPL-MCNC: 4 G/DL (ref 3–4.8)
ALBUMIN/GLOB SERPL: 1.3 {RATIO} (ref 1.1–1.8)
ALT SERPL-CCNC: 7 U/L (ref 7–56)
APPEARANCE UR: CLEAR
AST SERPL-CCNC: 21 U/L (ref 14–36)
BACTERIA #/AREA URNS HPF: (no result) /HPF
BASOPHILS # BLD AUTO: 0.05 K/MM3 (ref 0–2)
BASOPHILS NFR BLD: 0.6 % (ref 0–3)
BILIRUB UR-MCNC: NEGATIVE MG/DL
BUN SERPL-MCNC: 18 MG/DL (ref 7–21)
CALCIUM SERPL-MCNC: 9.4 MG/DL (ref 8.4–10.5)
COLOR UR: (no result)
EOSINOPHIL # BLD: 0.3 10*3/UL (ref 0–0.7)
EOSINOPHIL NFR BLD: 3.4 % (ref 1.5–5)
EPI CELLS #/AREA URNS HPF: (no result) /HPF (ref 0–5)
ERYTHROCYTE [DISTWIDTH] IN BLOOD BY AUTOMATED COUNT: 15.8 % (ref 11.5–14.5)
GFR NON-AFRICAN AMERICAN: > 60
GLUCOSE UR STRIP-MCNC: NEGATIVE MG/DL
HDLC SERPL-MCNC: 34 MG/DL (ref 29–60)
HGB BLD-MCNC: 12.6 G/DL (ref 12–16)
LDLC SERPL-MCNC: 115 MG/DL (ref 0–129)
LEUKOCYTE ESTERASE UR-ACNC: (no result) LEU/UL
LYMPHOCYTES # BLD: 2.9 10*3/UL (ref 1.2–3.4)
LYMPHOCYTES NFR BLD AUTO: 36.1 % (ref 22–35)
MCH RBC QN AUTO: 28.3 PG (ref 25–35)
MCHC RBC AUTO-ENTMCNC: 32.3 G/DL (ref 31–37)
MCV RBC AUTO: 87.6 FL (ref 80–105)
MONOCYTES # BLD AUTO: 0.7 10*3/UL (ref 0.1–0.6)
MONOCYTES NFR BLD: 8.8 % (ref 1–6)
PH UR STRIP: 7.5 [PH] (ref 4.7–8)
PLATELET # BLD: 237 10^3/UL (ref 120–450)
PMV BLD AUTO: 10 FL (ref 7–11)
PROT UR STRIP-MCNC: NEGATIVE MG/DL
RBC # BLD AUTO: 4.45 10^6/UL (ref 3.5–6.1)
RBC # UR STRIP: NEGATIVE /UL
RBC #/AREA URNS HPF: (no result) /HPF (ref 0–2)
SP GR UR STRIP: 1.01 (ref 1–1.03)
TROPONIN I SERPL-MCNC: < 0.01 NG/ML
UROBILINOGEN UR STRIP-ACNC: 0.2 E.U./DL
WBC # BLD AUTO: 7.9 10^3/UL (ref 4.5–11)
WBC #/AREA URNS HPF: (no result) /HPF (ref 0–6)

## 2019-03-16 PROCEDURE — 83735 ASSAY OF MAGNESIUM: CPT

## 2019-03-16 PROCEDURE — 76705 ECHO EXAM OF ABDOMEN: CPT

## 2019-03-16 PROCEDURE — 71045 X-RAY EXAM CHEST 1 VIEW: CPT

## 2019-03-16 PROCEDURE — 82948 REAGENT STRIP/BLOOD GLUCOSE: CPT

## 2019-03-16 PROCEDURE — 99285 EMERGENCY DEPT VISIT HI MDM: CPT

## 2019-03-16 PROCEDURE — 84484 ASSAY OF TROPONIN QUANT: CPT

## 2019-03-16 PROCEDURE — 85025 COMPLETE CBC W/AUTO DIFF WBC: CPT

## 2019-03-16 PROCEDURE — 87086 URINE CULTURE/COLONY COUNT: CPT

## 2019-03-16 PROCEDURE — 83036 HEMOGLOBIN GLYCOSYLATED A1C: CPT

## 2019-03-16 PROCEDURE — 84443 ASSAY THYROID STIM HORMONE: CPT

## 2019-03-16 PROCEDURE — 93005 ELECTROCARDIOGRAM TRACING: CPT

## 2019-03-16 PROCEDURE — 83615 LACTATE (LD) (LDH) ENZYME: CPT

## 2019-03-16 PROCEDURE — 80061 LIPID PANEL: CPT

## 2019-03-16 PROCEDURE — 36415 COLL VENOUS BLD VENIPUNCTURE: CPT

## 2019-03-16 PROCEDURE — 82550 ASSAY OF CK (CPK): CPT

## 2019-03-16 PROCEDURE — 80053 COMPREHEN METABOLIC PANEL: CPT

## 2019-03-16 PROCEDURE — 81001 URINALYSIS AUTO W/SCOPE: CPT

## 2019-03-16 RX ADMIN — VITAMIN D, TAB 1000IU (100/BT) SCH INTLU: 25 TAB at 10:57

## 2019-03-16 RX ADMIN — INSULIN LISPRO SCH: 100 INJECTION, SOLUTION INTRAVENOUS; SUBCUTANEOUS at 17:00

## 2019-03-16 RX ADMIN — PANTOPRAZOLE SODIUM SCH MG: 40 TABLET, DELAYED RELEASE ORAL at 12:34

## 2019-03-16 RX ADMIN — INSULIN LISPRO SCH: 100 INJECTION, SOLUTION INTRAVENOUS; SUBCUTANEOUS at 22:09

## 2019-03-16 RX ADMIN — INSULIN LISPRO SCH: 100 INJECTION, SOLUTION INTRAVENOUS; SUBCUTANEOUS at 12:26

## 2019-03-16 RX ADMIN — POLYETHYLENE GLYCOL 3350 SCH GM: 17 POWDER, FOR SOLUTION ORAL at 12:34

## 2019-03-16 RX ADMIN — INSULIN LISPRO SCH U: 100 INJECTION, SOLUTION INTRAVENOUS; SUBCUTANEOUS at 17:35

## 2019-03-16 RX ADMIN — INSULIN LISPRO SCH: 100 INJECTION, SOLUTION INTRAVENOUS; SUBCUTANEOUS at 07:37

## 2019-03-16 NOTE — ED PDOC
Arrival/HPI





- General


Chief Complaint: Chest Pain


Time Seen by Provider: 03/16/19 03:38


Historian: Patient





- History of Present Illness


Narrative History of Present Illness (Text): 





03/16/19 04:43


68 year old female with history of syncope, abdominal hernia, DM, Rheumatoid 

Arthritis, Appendectomy, Breast CA (lumpectomy, radiation, last 15 yrs ago) 

presents to emergency department complaining of mid sternal chest pain since 1 

pm earlier today with associated shortness of breath and dizziness. Pain is not 

exertional, as patient reports she was watching tv when it occurred and it has 

been constant since then. Patient has a history of reflux and usually takes a 

zantac to relieve the pain, but this time she notes the pain felt different and 

the zantac didn't work. Patient reports a normal stress test a year or two ago. 

Patient Patient denies any fevers, chills, headache, cough, abdominal pain, 

nausea, vomiting, diarrhea, back pain, neck pain, or any other complaints. 





Time/Duration: Other (1 pm)


Symptom Onset: Gradual


Symptom Course: Unchanged


Activities at Onset: Light


Context: Home





Past Medical History





- Provider Review


Nursing Documentation Reviewed: Yes





- Infectious Disease


Hx of Infectious Diseases: None





- Tetanus Immunization


Tetanus Immunization: Unknown





- Cardiac


Hx Cardiac Disorders: No





- Pulmonary


Hx Respiratory Disorders: No





- Neurological


Hx Neurological Disorder: No





- HEENT


Hx HEENT Disorder: No





- Renal


Hx Renal Disorder: No





- Endocrine/Metabolic


Hx Endocrine Disorders: Yes


Hx Diabetes Mellitus Type 2: Yes





- Hematological/Oncological


Hx Blood Disorders: Yes


Hx Cancer: Yes





- Integumentary


Hx Dermatological Disorder: No





- Musculoskeletal/Rheumatological


Hx Musculoskeletal Disorders: Yes


Hx Rheumatoid Arthritis: Yes





- Gastrointestinal


Hx Gastrointestinal Disorders: Yes


Hx Gastroesophageal Reflux: Yes





- Genitourinary/Gynecological


Hx Genitourinary Disorders: No





- Psychiatric


Hx Psychophysiologic Disorder: Yes


Hx Anxiety: Yes


Hx Substance Use: No





- Surgical History


Hx Appendectomy: Yes


Hx Orthopedic Surgery: Yes (L SHOULDER)


Other/Comment: LUMPECTOMY





- Anesthesia


Hx Anesthesia Reactions: No


Hx Malignant Hyperthermia: No





- Suicidal Assessment


Feels Threatened In Home Enviroment: No





Family/Social History





- Physician Review


Nursing Documentation Reviewed: Yes


Family/Social History: Unknown Family HX


Smoking Status: Never Smoked


Hx Alcohol Use: No


Hx Substance Use: No


Hx Substance Use Treatment: No





Allergies/Home Meds


Allergies/Adverse Reactions: 


Allergies





ciprofloxacin [From Cipro] Adverse Reaction (Severe, Verified 03/16/19 03:45)


   VOMITING


codeine Adverse Reaction (Severe, Verified 03/16/19 03:45)


   VOMITING








Home Medications: 


                                    Home Meds











 Medication  Instructions  Recorded  Confirmed


 


Aspirin [Aspir 81] 81 mg PO DAILY 02/03/14 03/16/19


 


DULoxetine [Cymbalta] 60 mg PO BID 02/03/14 03/16/19


 


Lorazepam [Ativan] 1 mg PO TID 02/03/14 03/16/19


 


Rosuvastatin Calcium [Crestor] 10 mg PO QAM 02/03/14 03/16/19


 


Glipizide [Glipizide ER] 10 mg PO BID 11/23/15 03/16/19


 


Celecoxib [Celebrex] 200 mg PO DAILY 01/09/18 03/16/19


 


Cholecalciferol [Vitamin D] 1,000 iu PO DAILY 01/09/18 03/16/19


 


Etanercept [Enbrel] 50 mg SC WED 01/09/18 03/16/19


 


Evening Primrose Oil 2 cap PO DAILY 01/09/18 03/16/19


 


Folic Acid 1 mg PO DAILY 01/09/18 03/16/19


 


Insulin Degludec [Tresiba 10 unit SQ QAM 01/09/18 03/16/19





Flextouch U-100]   


 


Linagliptin [Tradjenta] 5 mg PO DAILY 01/09/18 03/16/19


 


Methotrexate 6 tab PO WED 01/09/18 03/16/19














Review of Systems





- Physician Review


All systems were reviewed & negative as marked: Yes





- Review of Systems


Constitutional: absent: Fevers


Respiratory: SOB


Cardiovascular: Chest Pain (mid sternal )


Gastrointestinal: absent: Abdominal Pain, Diarrhea, Nausea, Vomiting


Genitourinary Female: absent: Urine Output Changes


Musculoskeletal: absent: Back Pain, Neck Pain


Skin: absent: Rash


Neurological: Dizziness.  absent: Headache





Physical Exam


Vital Signs Reviewed: Yes





Vital Signs











  Temp Pulse Resp BP Pulse Ox


 


 03/16/19 03:20  97.9 F  86  16  136/83  98











Temperature: Afebrile


Blood Pressure: Normal


Pulse: Regular


Respiratory Rate: Tachypneic


Appearance: Positive for: Well-Appearing, Non-Toxic, Comfortable


Pain Distress: None


Mental Status: Positive for: Alert and Oriented X 3





- Systems Exam


Head: Present: Atraumatic, Normocephalic


Pupils: Present: PERRL


Extroacular Muscles: Present: EOMI


Conjunctiva: Present: Normal


Mouth: Present: Moist Mucous Membranes


Neck: Present: Normal Range of Motion


Respiratory/Chest: Present: Clear to Auscultation, Good Air Exchange.  No: 

Respiratory Distress, Accessory Muscle Use


Cardiovascular: Present: Regular Rate and Rhythm, Normal S1, S2.  No: Murmurs


Abdomen: No: Tenderness, Distention, Peritoneal Signs


Back: Present: Normal Inspection


Upper Extremity: Present: Normal Inspection.  No: Cyanosis, Edema


Lower Extremity: Present: Normal Inspection.  No: Edema


Neurological: Present: GCS=15, CN II-XII Intact, Speech Normal


Skin: Present: Warm, Dry, Normal Color.  No: Rashes


Psychiatric: Present: Anxious (mildly )





Medical Decision Making


ED Course and Treatment: 





03/16/19 05:05


Impression:


68 year old female presents to emergency department for mid sternal chest pain 

with associated shortness of breath and dizziness since 1 pm. 





Plan:


-- Labs


-- Chest X-ray 


-- Urinalysis 


-- Reassess and disposition





Prior Visits:


Notes and results from previous visits were reviewed. 





Progress Notes:





03/16/19 05:06


EKG: Ordered, reviewed, and independently interpreted the EKG.


Rate : 98 BPM


Rhythm : NSR


Interpretation : Normal axis, normal intervals, no ST elevations, ocassional 

PVCs





03/16/19 05:29


Chest X-ray, reviewed by radiologist:


Mild left basilar atelectasis 





Patient given 324mg ASA.





Results reviewed and discussed with patient. Will admit for further evaluation 

of chest pain and dyspnea. Case discussed with patient's PMD Dr. Peraza who 

accepts patient to his service for admission.





- Lab Interpretations


Lab Results: 





                                        











Total Bilirubin  0.6 mg/dL (0.2-1.3)   03/16/19  04:10    


 


AST  21 U/L (14-36)   03/16/19  04:10    


 


ALT  7 U/L (7-56)   03/16/19  04:10    


 


Alkaline Phosphatase  60 U/L ()   03/16/19  04:10    


 


Total Protein  7.1 g/dL (5.8-8.3)   03/16/19  04:10    


 


Albumin  4.0 g/dL (3.0-4.8)   03/16/19  04:10    


 


Globulin  3.0 gm/dL  03/16/19  04:10    


 


Albumin/Globulin Ratio  1.3  (1.1-1.8)   03/16/19  04:10    














- RAD Interpretation


Radiology Orders: 











03/16/19 04:06


CHEST PORTABLE [RAD] Stat 














- Medication Orders


Current Medication Orders: 














Discontinued Medications





Aspirin (Aspirin)  325 mg PO STAT STA


   Stop: 03/16/19 04:07


   Last Admin: 03/16/19 04:19  Dose: 325 mg











- Scribe Statement


The provider has reviewed the documentation as recorded by the Scribe





Chidi Henderson





All medical record entries made by the Scribe were at my direction and 

personally dictated by me. I have reviewed the chart and agree that the record 

accurately reflects my personal performance of the history, physical exam, 

medical decision making, and the department course for this patient. I have also

 personally directed, reviewed, and agree with the discharge instructions and 

disposition. 








Disposition/Present on Arrival





- Present on Arrival


Any Indicators Present on Arrival: Yes


History of DVT/PE: No


History of Uncontrolled Diabetes: Yes


Urinary Catheter: No


History of Decub. Ulcer: No


History Surgical Site Infection Following: None





- Disposition


Have Diagnosis and Disposition been Completed?: Yes


Diagnosis: 


 Chest pain





Disposition: HOSPITALIZED


Disposition Time: 05:59


Patient Problems: 


                             Current Active Problems











Problem Status Onset


 


Chest pain Acute 











Condition: STABLE

## 2019-03-16 NOTE — CP.PCM.HP
<Lilly Peters - Last Filed: 03/16/19 12:01>





History of Present Illness





- History of Present Illness


History of Present Illness: 


IM resident H&P note for Dr. Peraza's service





CC: Epigastric pain





Patient is a 67 y/o female with PMHX of RA, Knee OA, IDDM2, syncope, breast 

cancer (s/p lumpectomy, and radiation), back pain with epidural 3 weeks ago, h/o

intermittent constipation presenting with epigastric pain. Patient states the 

pain started yesterday at around 1 pm while she was seating watching TV. The 

pain is constant and is non radiating. States in the past when she gets this 

type of bed, it usually improves with zantac, however this time the pain 

remains. The epigastric pain persisted until 3 AM, thus patient decided to come 

in to the ED for evaluation. Pt states the pain improved a little with asa. 

Denies SOB. No headache, dizziness. last bowel movement was on Thursday, denies 

diarrhea. No fever or chills. 





PMhx: RA, Knee OA, IDDM2, syncope, breast cancer (s/p lumpectomy, and 

radiation)in 2002, back pain with epidural 3 weeks ago, depression, h/o 

intermittent constipation and gerd  


PSHx: lefdt breast lumpectomy in 2002, epidural 3 week s ago


Family hx: non contributory


Social: denies tobacco, alcohol or illicit drug use. 


Home meds: as per chart


Allergy: cipro and codeine





Present on Admission





- Present on Admission


Any Indicators Present on Admission: No


History of DVT/PE: No


History of Uncontrolled Diabetes: No


Urinary Catheter: No


Decubitus Ulcer Present: No





Review of Systems





- Constitutional


Constitutional: absent: Chills, Fever, Headache, Lethargy, Malaise





- EENT


Eyes: absent: Blurred Vision, Change in Vision


Ears: absent: Ear Pain, Disequilibrium, Dizziness


Nose/Mouth/Throat: absent: Nasal Congestion, Sore Throat





- Cardiovascular


Cardiovascular: absent: Chest Pain, Chest Pain at Rest, Chest Pain with 

Activity, Claudication, Dyspnea, Dyspnea on Exertion, Edema, Leg Edema, 

Radiating Pain, Slow Heart Rate, Syncope





- Respiratory


Respiratory: absent: Cough, Dyspnea, Dyspnea on Exertion, Wheezing, Chest 

Congestion, Pain with Coughing





- Gastrointestinal


Gastrointestinal: Abdominal Pain (epigastric pain ), Constipation.  absent: 

Belching, Bloating, Cramping, Dyspepsia, Dysphagia, Early Satiety, Fecal 

Incontinence, Heartburn, Hematemesis, Nausea, Vomiting





- Genitourinary


Genitourinary: absent: Dysuria, Hematuria, Pyuria, Nocturia, Urinary 

Incontinence, Urinary Hesitance





- Musculoskeletal


Musculoskeletal: absent: Atrophy, Back Pain, Joint Swelling, Stiffness, Tingling





- Integumentary


Integumentary: absent: Rash, Skin Pain, Wounds





- Neurological


Neurological: absent: Dizziness, Headaches, Syncope





- Psychiatric


Psychiatric: absent: Anxiety, Confusion, Depression





- Endocrine


Endocrine: absent: Fatigue, Flushing, Palpitations, Polydipsia, Polyphagia, 

Polyuria





- Hematologic/Lymphatic


Hematologic: absent: Easy Bleeding, Easy Bruising





Past Patient History





- Infectious Disease


Hx of Infectious Diseases: None





- Tetanus Immunizations


Tetanus Immunization: Unknown





- Past Social History


Smoking Status: Never Smoked


Alcohol: None


Drugs: Denies


Home Situation {Lives}: With Family





- CARDIAC


Hx Cardiac Disorders: No





- PULMONARY


Hx Respiratory Disorders: No





- NEUROLOGICAL


Hx Neurological Disorder: No





- HEENT


Hx HEENT Problems: No





- RENAL


Hx Chronic Kidney Disease: No





- ENDOCRINE/METABOLIC


Hx Endocrine Disorders: Yes


Hx Diabetes Mellitus Type 2: Yes





- HEMATOLOGICAL/ONCOLOGICAL


Hx Blood Disorders: Yes


Hx Cancer: Yes





- INTEGUMENTARY


Hx Dermatological Problems: No





- MUSCULOSKELETAL/RHEUMATOLOGICAL


Hx Musculoskeletal Disorders: Yes


Hx Rheumatoid Arthritis: Yes





- GASTROINTESTINAL


Hx Gastrointestinal Disorders: Yes


Hx Gastroesophageal Reflux: Yes





- GENITOURINARY/GYNECOLOGICAL


Hx Genitourinary Disorders: No





- PSYCHIATRIC


Hx Psychophysiologic Disorder: Yes


Hx Anxiety: Yes


Hx Substance Use: No





- SURGICAL HISTORY


Hx Appendectomy: Yes


Hx Orthopedic Surgery: Yes (L SHOULDER)


Other/Comment: LUMPECTOMY





- ANESTHESIA


Hx Anesthesia Reactions: No


Hx Malignant Hyperthermia: No





Meds


Allergies/Adverse Reactions: 


                                    Allergies











Allergy/AdvReac Type Severity Reaction Status Date / Time


 


ciprofloxacin [From Cipro] AdvReac Severe VOMITING Verified 03/16/19 11:45


 


codeine AdvReac Severe VOMITING Verified 03/16/19 11:45














Physical Exam





- Constitutional


Appears: No Acute Distress





- Head Exam


Head Exam: ATRAUMATIC, NORMAL INSPECTION, NORMOCEPHALIC





- Eye Exam


Eye Exam: EOMI, Normal appearance, PERRL.  absent: Scleral icterus


Pupil Exam: NORMAL ACCOMODATION





- ENT Exam


ENT Exam: Mucous Membranes Moist





- Neck Exam


Neck exam: Positive for: Normal Inspection.  Negative for: Lymphadenopathy, 

Meningismus, Tenderness, Thyromegaly





- Respiratory Exam


Respiratory Exam: Clear to Auscultation Bilateral, NORMAL BREATHING PATTERN.  a

bsent: Decreased Breath Sounds, Rales, Rhonchi, Wheezes, Respiratory Distress, 

Stridor





- Cardiovascular Exam


Cardiovascular Exam: REGULAR RHYTHM, RRR, +S1, +S2, Systolic Murmur.  absent: 

Bradycardia, Tachycardia, Diastolic murmur, Gallop, Irregular Rhythm, JVD, Rubs





- GI/Abdominal Exam


GI & Abdominal Exam: Normal Bowel Sounds, Soft, Tenderness (epigastric pain).  

absent: Distended, Firm, Guarding, Hypoactive Bowel Sounds, Pulsatile Mass, 

Rebound, Rigid





- Extremities Exam


Extremities exam: Positive for: normal inspection.  Negative for: pedal edema





- Back Exam


Back exam: NORMAL INSPECTION





- Neurological Exam


Neurological exam: Alert, Oriented x3





- Psychiatric Exam


Psychiatric exam: Normal Affect, Normal Mood





- Skin


Skin Exam: Dry, Intact, Normal Color, Warm





Results





- Vital Signs


Recent Vital Signs: 





                                Last Vital Signs











Temp  98 F   03/16/19 07:31


 


Pulse  89   03/16/19 07:31


 


Resp  17   03/16/19 07:31


 


BP  140/90   03/16/19 07:31


 


Pulse Ox  98   03/16/19 07:31














- Labs


Result Diagrams: 


                                 03/16/19 04:10





                                 03/16/19 04:10


Labs: 





                         Laboratory Results - last 24 hr











  03/16/19 03/16/19 03/16/19





  04:10 04:10 04:10


 


WBC  7.9  


 


RBC  4.45  


 


Hgb  12.6  


 


Hct  39.0  


 


MCV  87.6  


 


MCH  28.3  


 


MCHC  32.3  


 


RDW  15.8 H  


 


Plt Count  237  


 


MPV  10.0  


 


Neut % (Auto)  51.1  


 


Lymph % (Auto)  36.1 H  


 


Mono % (Auto)  8.8 H  


 


Eos % (Auto)  3.4  


 


Baso % (Auto)  0.6  


 


Lymph # (Auto)  2.9  


 


Mono # (Auto)  0.7 H  


 


Eos # (Auto)  0.3  


 


Baso # (Auto)  0.05  


 


Absolute Neuts (auto)  4.05  


 


Sodium   141 


 


Potassium   3.9 


 


Chloride   102 


 


Carbon Dioxide   29 


 


Anion Gap   13 


 


BUN   18 


 


Creatinine   0.8 


 


Est GFR ( Amer)   > 60 


 


Est GFR (Non-Af Amer)   > 60 


 


POC Glucose (mg/dL)   


 


Random Glucose   89 


 


Calcium   9.4 


 


Magnesium   2.1 


 


Total Bilirubin   0.6 


 


AST   21 


 


ALT   7 


 


Alkaline Phosphatase   60 


 


Lactate Dehydrogenase   338 


 


Total Creatine Kinase   39 


 


Troponin I   < 0.01 


 


Total Protein   7.1 


 


Albumin   4.0 


 


Globulin   3.0 


 


Albumin/Globulin Ratio   1.3 


 


Triglycerides    119


 


Cholesterol    178


 


LDL Cholesterol Direct    115


 


HDL Cholesterol    34


 


Urine Color   


 


Urine Appearance   


 


Urine pH   


 


Ur Specific Gravity   


 


Urine Protein   


 


Urine Glucose (UA)   


 


Urine Ketones   


 


Urine Blood   


 


Urine Nitrate   


 


Urine Bilirubin   


 


Urine Urobilinogen   


 


Ur Leukocyte Esterase   


 


Urine RBC   


 


Urine WBC   


 


Ur Epithelial Cells   


 


Urine Bacteria   














  03/16/19 03/16/19





  04:20 07:35


 


WBC  


 


RBC  


 


Hgb  


 


Hct  


 


MCV  


 


MCH  


 


MCHC  


 


RDW  


 


Plt Count  


 


MPV  


 


Neut % (Auto)  


 


Lymph % (Auto)  


 


Mono % (Auto)  


 


Eos % (Auto)  


 


Baso % (Auto)  


 


Lymph # (Auto)  


 


Mono # (Auto)  


 


Eos # (Auto)  


 


Baso # (Auto)  


 


Absolute Neuts (auto)  


 


Sodium  


 


Potassium  


 


Chloride  


 


Carbon Dioxide  


 


Anion Gap  


 


BUN  


 


Creatinine  


 


Est GFR ( Amer)  


 


Est GFR (Non-Af Amer)  


 


POC Glucose (mg/dL)   105


 


Random Glucose  


 


Calcium  


 


Magnesium  


 


Total Bilirubin  


 


AST  


 


ALT  


 


Alkaline Phosphatase  


 


Lactate Dehydrogenase  


 


Total Creatine Kinase  


 


Troponin I  


 


Total Protein  


 


Albumin  


 


Globulin  


 


Albumin/Globulin Ratio  


 


Triglycerides  


 


Cholesterol  


 


LDL Cholesterol Direct  


 


HDL Cholesterol  


 


Urine Color  Light yellow 


 


Urine Appearance  Clear 


 


Urine pH  7.5 


 


Ur Specific Gravity  1.010 


 


Urine Protein  Negative 


 


Urine Glucose (UA)  Negative 


 


Urine Ketones  Negative 


 


Urine Blood  Negative 


 


Urine Nitrate  Negative 


 


Urine Bilirubin  Negative 


 


Urine Urobilinogen  0.2 


 


Ur Leukocyte Esterase  Trace H 


 


Urine RBC  0 - 2 


 


Urine WBC  0 - 2 


 


Ur Epithelial Cells  0 - 2 


 


Urine Bacteria  Few 














- EKG Data


EKG Interpreted by: Myself


EKG shows normal: Sinus rhythm


Rate: Normal





Assessment & Plan





- Assessment and Plan (Free Text)


Assessment: 


1- Epigastric pain r/o atypical presentation of ACS, r/o gallstones, r/o GERD


2- IDDM2


3- RA


4- Anxiety


5- Back pain 


6- Knee OA


7- Vitamin D deficiency


8- constipation 


9- Depression


Plan: 


Patient admitted on tele for observation; Troponin is negative x1, will trend. 

EKG with no acute ST/T weave changes. Will add tsh. Patient's s/p high dose asa 

in the ED. Will continue with 81 mg asa. Cardiologist is consulted. Patient with

16.5% risk of cardiac event in the next 10 years according ot ASCVD 2013 risk 

calculator, will start lipitor 40 mg. Abdominal u/s ordered to evaluate the 

gallbladder. Will give miralax for constipation, and add protonix for possible 

gerd. 


For DM, patient's glucose is normal, will hold glipizide and insulin and check 

hgba1c. Will start insulin sliding scale, and moderate carbohydrate diet. Will 

continue with cymbalta for depression and pain. On Ativan for anxiety. Continue 

folic acid and vitamin D. Patient's RA medications- enbrel and metothrexate are 

not due until Wednesday, thus will hold. Compressive devices for DVT 

prophylaxis. 





Patient seen, examined and case discussed with Dr. Peraza.





- Date & Time


Date: 03/16/19


Time: 11:00





<Lupillo Peraza S - Last Filed: 03/16/19 19:03>





Results





- Vital Signs


Recent Vital Signs: 





                                Last Vital Signs











Temp  97.8 F   03/16/19 12:00


 


Pulse  75   03/16/19 16:30


 


Resp  18   03/16/19 16:30


 


BP  117/74   03/16/19 12:00


 


Pulse Ox  98   03/16/19 09:01














- Labs


Result Diagrams: 


                                 03/16/19 04:10





                                 03/16/19 04:10


Labs: 





                         Laboratory Results - last 24 hr











  03/16/19 03/16/19 03/16/19





  04:10 04:10 04:10


 


WBC  7.9  


 


RBC  4.45  


 


Hgb  12.6  


 


Hct  39.0  


 


MCV  87.6  


 


MCH  28.3  


 


MCHC  32.3  


 


RDW  15.8 H  


 


Plt Count  237  


 


MPV  10.0  


 


Neut % (Auto)  51.1  


 


Lymph % (Auto)  36.1 H  


 


Mono % (Auto)  8.8 H  


 


Eos % (Auto)  3.4  


 


Baso % (Auto)  0.6  


 


Lymph # (Auto)  2.9  


 


Mono # (Auto)  0.7 H  


 


Eos # (Auto)  0.3  


 


Baso # (Auto)  0.05  


 


Absolute Neuts (auto)  4.05  


 


Sodium   141 


 


Potassium   3.9 


 


Chloride   102 


 


Carbon Dioxide   29 


 


Anion Gap   13 


 


BUN   18 


 


Creatinine   0.8 


 


Est GFR ( Amer)   > 60 


 


Est GFR (Non-Af Amer)   > 60 


 


POC Glucose (mg/dL)   


 


Random Glucose   89 


 


Calcium   9.4 


 


Magnesium   2.1 


 


Total Bilirubin   0.6 


 


AST   21 


 


ALT   7 


 


Alkaline Phosphatase   60 


 


Lactate Dehydrogenase   338 


 


Total Creatine Kinase   39 


 


Troponin I   < 0.01 


 


Total Protein   7.1 


 


Albumin   4.0 


 


Globulin   3.0 


 


Albumin/Globulin Ratio   1.3 


 


Triglycerides    119


 


Cholesterol    178


 


LDL Cholesterol Direct    115


 


HDL Cholesterol    34


 


TSH 3rd Generation   


 


Urine Color   


 


Urine Appearance   


 


Urine pH   


 


Ur Specific Gravity   


 


Urine Protein   


 


Urine Glucose (UA)   


 


Urine Ketones   


 


Urine Blood   


 


Urine Nitrate   


 


Urine Bilirubin   


 


Urine Urobilinogen   


 


Ur Leukocyte Esterase   


 


Urine RBC   


 


Urine WBC   


 


Ur Epithelial Cells   


 


Urine Bacteria   














  03/16/19 03/16/19 03/16/19





  04:20 07:35 10:20


 


WBC   


 


RBC   


 


Hgb   


 


Hct   


 


MCV   


 


MCH   


 


MCHC   


 


RDW   


 


Plt Count   


 


MPV   


 


Neut % (Auto)   


 


Lymph % (Auto)   


 


Mono % (Auto)   


 


Eos % (Auto)   


 


Baso % (Auto)   


 


Lymph # (Auto)   


 


Mono # (Auto)   


 


Eos # (Auto)   


 


Baso # (Auto)   


 


Absolute Neuts (auto)   


 


Sodium   


 


Potassium   


 


Chloride   


 


Carbon Dioxide   


 


Anion Gap   


 


BUN   


 


Creatinine   


 


Est GFR ( Amer)   


 


Est GFR (Non-Af Amer)   


 


POC Glucose (mg/dL)   105 


 


Random Glucose   


 


Calcium   


 


Magnesium   


 


Total Bilirubin   


 


AST   


 


ALT   


 


Alkaline Phosphatase   


 


Lactate Dehydrogenase   


 


Total Creatine Kinase   


 


Troponin I    < 0.01


 


Total Protein   


 


Albumin   


 


Globulin   


 


Albumin/Globulin Ratio   


 


Triglycerides   


 


Cholesterol   


 


LDL Cholesterol Direct   


 


HDL Cholesterol   


 


TSH 3rd Generation   


 


Urine Color  Light yellow  


 


Urine Appearance  Clear  


 


Urine pH  7.5  


 


Ur Specific Gravity  1.010  


 


Urine Protein  Negative  


 


Urine Glucose (UA)  Negative  


 


Urine Ketones  Negative  


 


Urine Blood  Negative  


 


Urine Nitrate  Negative  


 


Urine Bilirubin  Negative  


 


Urine Urobilinogen  0.2  


 


Ur Leukocyte Esterase  Trace H  


 


Urine RBC  0 - 2  


 


Urine WBC  0 - 2  


 


Ur Epithelial Cells  0 - 2  


 


Urine Bacteria  Few  














  03/16/19 03/16/19





  10:20 16:45


 


WBC  


 


RBC  


 


Hgb  


 


Hct  


 


MCV  


 


MCH  


 


MCHC  


 


RDW  


 


Plt Count  


 


MPV  


 


Neut % (Auto)  


 


Lymph % (Auto)  


 


Mono % (Auto)  


 


Eos % (Auto)  


 


Baso % (Auto)  


 


Lymph # (Auto)  


 


Mono # (Auto)  


 


Eos # (Auto)  


 


Baso # (Auto)  


 


Absolute Neuts (auto)  


 


Sodium  


 


Potassium  


 


Chloride  


 


Carbon Dioxide  


 


Anion Gap  


 


BUN  


 


Creatinine  


 


Est GFR ( Amer)  


 


Est GFR (Non-Af Amer)  


 


POC Glucose (mg/dL)  


 


Random Glucose  


 


Calcium  


 


Magnesium  


 


Total Bilirubin  


 


AST  


 


ALT  


 


Alkaline Phosphatase  


 


Lactate Dehydrogenase  


 


Total Creatine Kinase  


 


Troponin I   < 0.01


 


Total Protein  


 


Albumin  


 


Globulin  


 


Albumin/Globulin Ratio  


 


Triglycerides  


 


Cholesterol  


 


LDL Cholesterol Direct  


 


HDL Cholesterol  


 


TSH 3rd Generation  3.12 


 


Urine Color  


 


Urine Appearance  


 


Urine pH  


 


Ur Specific Gravity  


 


Urine Protein  


 


Urine Glucose (UA)  


 


Urine Ketones  


 


Urine Blood  


 


Urine Nitrate  


 


Urine Bilirubin  


 


Urine Urobilinogen  


 


Ur Leukocyte Esterase  


 


Urine RBC  


 


Urine WBC  


 


Ur Epithelial Cells  


 


Urine Bacteria  














Assessment & Plan





- Assessment and Plan (Free Text)


Plan: 





 Pt was seen and examined. I agree with the note of the medical resident. I was 

involved in the plan of care.

## 2019-03-16 NOTE — CON
DATE OF CONSULTATION:  2019



REQUESTING PHYSICIAN:  Lupillo Peraza MD



REASON FOR CONSULTATION:  Chest and epigastric discomfort.



HISTORY:  This is a 68-year-old woman well known to me with a history of

diabetes and hyperlipidemia, who presents to the emergency room complaining

of retrosternal and epigastric chest discomfort.  She states that her pain

began yesterday afternoon and lingered on most of the day.  She took

Zantac, which she feels usually helps these sort of symptoms, but did not

notice improvement.  Her pain worsened overnight and she became concerned

and presented to the emergency room.  Initial electrocardiogram shows no

acute abnormalities.  She did undergo stress testing last 2018 with

Persantine stress test that showed no evidence of ischemia with an ejection

fraction of 59%.



PAST HISTORY:  Notable for the problems mentioned above.  She did have

breast cancer for which she underwent radiation and lumpectomy 15 years

ago.  She also has a history of prior appendectomy and rheumatoid

arthritis.



FAMILY HISTORY:  Father  at age 61 from pneumonia.  Mother  at age

61 from cancer.  One brother  at age 58 from bladder cancer.



SOCIAL HISTORY:  She is a former smoker, having quit many years ago.  She

is .



CURRENT MEDICATIONS:  Aspirin, Ativan, Celebrex, Crestor, Cymbalta, Enbrel,

glipizide, methotrexate, Tradjenta, Tresiba, and vitamin D supplements.



ALLERGIES:  SHE HAS HAD A REACTION TO CODEINE AND CIPROFLOXACIN IN THE

PAST.



REVIEW OF SYSTEMS:  A 10-point review of systems is always unremarkable.



PHYSICAL EXAMINATION:

GENERAL:   She is an anxious-appearing middle-aged woman.

VITAL SIGNS:  Blood pressure is 138/76 with pulse of 76 and sinus. 

Respirations are 16.  She is afebrile.

HEENT:  Normocephalic, atraumatic.

NECK:  Supple.  No JVD noted.

CHEST:  Few scattered rhonchi heard.

HEART:  PMI in normal position.  No pathological murmurs or gallops noted.

ABDOMEN:  Soft and nontender with normoactive bowel sounds.

EXTREMITIES:  No edema.

SKIN:  Warm and dry.

PSYCHIATRIC:  Normal mood and affect.

NEUROLOGIC:  Alert and oriented x3.  No gross motor or sensory deficits

notable.



DIAGNOSTIC DATA:  Two sets of cardiac enzymes are negative.  Potassium 3.9,

BUN and creatinine 18 and 0.8.  White count 7.9, hemoglobin and hematocrit

12.6 and 39.0, platelet count of 237,000.  Cholesterol is 178 with an LDL

of 115, HDL 34, triglycerides of 119.



Chest x-ray reveals normal cardiac silhouette with clear lung fields. 

Electrocardiogram reveals sinus rhythm with nonspecific ST-T abnormalities.



IMPRESSION:

1.  Epigastric and chest discomfort, sounds more suspicious for GI cause 

given its unrelenting nature and characteristics.  She did have an

unremarkable stress test within the past year.

2.  Rest of the problems as noted.



RECOMMENDATIONS:  Telemetry measuring would be reasonable.  A GI evaluation

is encouraged.  Followup electrocardiogram and troponin will be checked

again.  Further recommendations will be made based upon her clinical course

and results.  See above findings.



Thank you for this consultation and I will be happy to follow along as

needed.





__________________________________________

Carlos Duckworth MD





DD:  2019 13:10:04

DT:  2019 13:13:13

Job # [id number]

## 2019-03-16 NOTE — US
Date of service: 



03/16/2019



HISTORY:

Chest pain, epigastric pain



COMPARISON:

03/15/2016.



TECHNIQUE:

Sonographic evaluation of the right upper quadrant of the abdomen.



FINDINGS:



LIVER:

Measures 15.1 cm in length. Hepatopedal blood flow. Fatty 

infiltration manifest ultrasonographically as increased echogenicity 

of the liver parenchyma.  No mass. No intrahepatic bile duct 

dilatation. 



GALLBLADDER:

Unremarkable. No gallstones. 



COMMON BILE DUCT:

Measures 8.0 mm.  No stones. No dilatation.



PANCREAS:

Unremarkable as visualized. No mass. No ductal dilatation.



RIGHT KIDNEY:

Measures 4.3 x 10.1 cm in length. Normal echogenicity. No calculus, 

mass, or hydronephrosis.



AORTA:

No aneurysmal dilatation.



IVC:

Unremarkable.



OTHER FINDINGS:

None .



IMPRESSION:

Hepatic steatosis.  Otherwise unremarkable study. No significant 

interval change compared to the prior examination(s).

## 2019-03-16 NOTE — RAD
Date of service: 



03/16/2019



HISTORY:

 chest pain 



COMPARISON:

06/23/2018 



FINDINGS:



LUNGS:

No active pulmonary disease.



PLEURA:

No significant pleural effusion identified, no pneumothorax apparent.



CARDIOVASCULAR:

No atherosclerotic calcification present



 No radiographic findings to suggest acute or significant 

cardiovascular disease.



OSSEOUS STRUCTURES:

No significant abnormalities.  Stable postoperative changes proximal 

left humerus. 



VISUALIZED UPPER ABDOMEN:

Normal.



OTHER FINDINGS:

None.



IMPRESSION:

No active disease. No significant interval change compared to the 

prior examination(s).

## 2019-03-17 VITALS — SYSTOLIC BLOOD PRESSURE: 118 MMHG | DIASTOLIC BLOOD PRESSURE: 74 MMHG | RESPIRATION RATE: 20 BRPM | TEMPERATURE: 97.7 F

## 2019-03-17 VITALS — HEART RATE: 81 BPM

## 2019-03-17 RX ADMIN — POLYETHYLENE GLYCOL 3350 SCH: 17 POWDER, FOR SOLUTION ORAL at 09:49

## 2019-03-17 RX ADMIN — VITAMIN D, TAB 1000IU (100/BT) SCH INTLU: 25 TAB at 09:47

## 2019-03-17 RX ADMIN — INSULIN LISPRO SCH: 100 INJECTION, SOLUTION INTRAVENOUS; SUBCUTANEOUS at 09:47

## 2019-03-17 RX ADMIN — POLYETHYLENE GLYCOL 3350 SCH GM: 17 POWDER, FOR SOLUTION ORAL at 09:47

## 2019-03-17 RX ADMIN — PANTOPRAZOLE SODIUM SCH MG: 40 TABLET, DELAYED RELEASE ORAL at 05:44

## 2019-03-17 NOTE — CP.PCM.DIS
<Lilly Peters - Last Filed: 03/17/19 11:05>





Provider





- Provider


Date of Admission: 


03/16/19 05:59





Attending physician: 


Lupillo Peraza MD





Primary care physician: 


Karmen


Consults: 








03/16/19 06:00


Consult [Physician Consult] Routine 


   Comment: 


   Consulting Provider: Carlos Duckworth


   Consulting Physician: Carlos Duckworth


   Reason for Consult: chest pain





03/16/19 16:47


Inpatient NP Core Measures Referral Routine 


   Comment: 


   Physician Instructions: 


   Reason For Exam: EVALUATION


Transition In Care/Readmission Reduction Routine 


   Comment: 


   Physician Instructions: 


   Reason For Exam: EVALUATION





03/16/19 16:50


Social Work Referral Routine 


   Comment: DSICHARGE PLANNING TO HOME


   Physician Instructions: 


   Reason For Exam: EVALUATION











Time Spent in preparation of Discharge (in minutes): 45





Diagnosis





- Discharge Diagnosis


(1) Atypical angina


Status: Resolved   





(2) Hepatic steatosis


Status: Chronic   





(3) Rheumatoid arthritis


Status: Chronic   





(4) Osteoarthritis


Status: Chronic   





(5) Diabetes mellitus


Status: Chronic   





(6) History of breast cancer


Status: Chronic   





(7) Syncope


Status: Chronic   





(8) Dyslipidemia


Status: Chronic   





Hospital Course





- Lab Results


Lab Results: 


                                  Micro Results





03/16/19 05:30   Urine,Clean Catch   Urine Culture - Final


                            No Growth (<1,000 CFU/ML)





                             Most Recent Lab Values











WBC  7.9 10^3/uL (4.5-11.0)   03/16/19  04:10    


 


RBC  4.45 10^6/uL (3.5-6.1)   03/16/19  04:10    


 


Hgb  12.6 g/dL (12.0-16.0)   03/16/19  04:10    


 


Hct  39.0 % (36.0-48.0)   03/16/19  04:10    


 


MCV  87.6 fl (80.0-105.0)   03/16/19  04:10    


 


MCH  28.3 pg (25.0-35.0)   03/16/19  04:10    


 


MCHC  32.3 g/dl (31.0-37.0)   03/16/19  04:10    


 


RDW  15.8 % (11.5-14.5)  H  03/16/19  04:10    


 


Plt Count  237 10^3/uL (120.0-450.0)   03/16/19  04:10    


 


MPV  10.0 fl (7.0-11.0)   03/16/19  04:10    


 


Neut % (Auto)  51.1 % (50.0-68.0)   03/16/19  04:10    


 


Lymph % (Auto)  36.1 % (22.0-35.0)  H  03/16/19  04:10    


 


Mono % (Auto)  8.8 % (1.0-6.0)  H  03/16/19  04:10    


 


Eos % (Auto)  3.4 % (1.5-5.0)   03/16/19  04:10    


 


Baso % (Auto)  0.6 % (0.0-3.0)   03/16/19  04:10    


 


Lymph # (Auto)  2.9  (1.2-3.4)   03/16/19  04:10    


 


Mono # (Auto)  0.7  (0.1-0.6)  H  03/16/19  04:10    


 


Eos # (Auto)  0.3  (0.0-0.7)   03/16/19  04:10    


 


Baso # (Auto)  0.05 K/mm3 (0.0-2.0)   03/16/19  04:10    


 


Absolute Neuts (auto)  4.05  (1.4-6.5)   03/16/19  04:10    


 


Sodium  141 mmol/L (132-148)   03/16/19  04:10    


 


Potassium  3.9 mmol/L (3.6-5.0)   03/16/19  04:10    


 


Chloride  102 mmol/L ()   03/16/19  04:10    


 


Carbon Dioxide  29 mmol/L (21-33)   03/16/19  04:10    


 


Anion Gap  13  (10-20)   03/16/19  04:10    


 


BUN  18 mg/dL (7-21)   03/16/19  04:10    


 


Creatinine  0.8 mg/dl (0.7-1.2)   03/16/19  04:10    


 


Est GFR ( Amer)  > 60   03/16/19  04:10    


 


Est GFR (Non-Af Amer)  > 60   03/16/19  04:10    


 


POC Glucose (mg/dL)  105 mg/dL ()   03/16/19  07:35    


 


Random Glucose  89 mg/dL ()   03/16/19  04:10    


 


Hemoglobin A1c  5.8 % (4.2-6.5)   03/16/19  10:20    


 


Calcium  9.4 mg/dL (8.4-10.5)   03/16/19  04:10    


 


Magnesium  2.1 mg/dL (1.7-2.2)   03/16/19  04:10    


 


Total Bilirubin  0.6 mg/dL (0.2-1.3)   03/16/19  04:10    


 


AST  21 U/L (14-36)   03/16/19  04:10    


 


ALT  7 U/L (7-56)   03/16/19  04:10    


 


Alkaline Phosphatase  60 U/L ()   03/16/19  04:10    


 


Lactate Dehydrogenase  338 U/L (333-699)   03/16/19  04:10    


 


Total Creatine Kinase  39 U/L ()   03/16/19  04:10    


 


Troponin I  < 0.01 ng/mL  03/16/19  16:45    


 


Total Protein  7.1 g/dL (5.8-8.3)   03/16/19  04:10    


 


Albumin  4.0 g/dL (3.0-4.8)   03/16/19  04:10    


 


Globulin  3.0 gm/dL  03/16/19  04:10    


 


Albumin/Globulin Ratio  1.3  (1.1-1.8)   03/16/19  04:10    


 


Triglycerides  119 mg/dL ()   03/16/19  04:10    


 


Cholesterol  178 mg/dL (130-200)   03/16/19  04:10    


 


LDL Cholesterol Direct  115 mg/dL (0-129)   03/16/19  04:10    


 


HDL Cholesterol  34 mg/dL (29-60)   03/16/19  04:10    


 


TSH 3rd Generation  3.12 mIU/mL (0.46-4.68)   03/16/19  10:20    


 


Urine Color  Light yellow  (YELLOW)   03/16/19  04:20    


 


Urine Appearance  Clear  (CLEAR)   03/16/19  04:20    


 


Urine pH  7.5  (4.7-8.0)   03/16/19  04:20    


 


Ur Specific Gravity  1.010  (1.005-1.035)   03/16/19  04:20    


 


Urine Protein  Negative mg/dL (<30 mg/dL)   03/16/19  04:20    


 


Urine Glucose (UA)  Negative mg/dL (NEGATIVE)   03/16/19  04:20    


 


Urine Ketones  Negative mg/dL (NEGATIVE)   03/16/19  04:20    


 


Urine Blood  Negative  (NEGATIVE)   03/16/19  04:20    


 


Urine Nitrate  Negative  (NEGATIVE)   03/16/19  04:20    


 


Urine Bilirubin  Negative  (NEGATIVE)   03/16/19  04:20    


 


Urine Urobilinogen  0.2 E.U./dL (<1 E.U./dL)   03/16/19  04:20    


 


Ur Leukocyte Esterase  Trace Yomi/uL (NEGATIVE)  H  03/16/19  04:20    


 


Urine RBC  0 - 2 /hpf (0-2)   03/16/19  04:20    


 


Urine WBC  0 - 2 /hpf (0-6)   03/16/19  04:20    


 


Ur Epithelial Cells  0 - 2 /hpf (0-5)   03/16/19  04:20    


 


Urine Bacteria  Few /hpf (NONE)   03/16/19  04:20    














- Hospital Course


Hospital Course: 


Patient is a RA, Knee OA, constipation, IDDM2, syncope, breast cancer (s/p 

lumpectomy, and radiation), back pain present with epigastric pain not relieved 

with zantac. Patient was admitted for observation to rule out atypical 

presentation of ACS. Troponin x3 was normal, EKG was normal with no acute ST/T 

wave changes. No acute events on tele, tsh was normal. Patient also had 

abdominal u/s to rule out gall bladder component, which revealed hepatic 

steatosis. Patient was seen by cardiologist and is cleared for discharged. 





Patient was normoglycemic in the hospital, hgbA1C of 5.8, patient advised to 

stop insulin, and follow up with primary care doctor in 1 week to see if she 

needs to resume the insulin.


Based on patient's ASCVD risk, patient requires high intensity statin, however 

patient states she has intolerance with high dose statin, thus patient is 

encouraged to follow up with pmd to try other statin class. 





Discharge diet: low fat, heart healthy, 2 gram sodium, with controlled 

carbohydrate.








Activity: May resume normal activity. 





- Date & Time of H&P


Date of H&P: 03/16/19


Time of H&P: 08:20





Discharge Exam





- Head Exam


Head Exam: ATRAUMATIC, NORMAL INSPECTION, NORMOCEPHALIC





- Eye Exam


Eye Exam: Normal appearance





- ENT Exam


ENT Exam: Mucous Membranes Moist





- Neck Exam


Neck exam: Normal Inspection





- Respiratory Exam


Respiratory Exam: Clear to PA & Lateral, NORMAL BREATHING PATTERN, UNREMARKABLE.

 absent: Rales, Rhonchi, Wheezes, Respiratory Distress, Stridor





- Cardiovascular Exam


Cardiovascular Exam: REGULAR RHYTHM, +S1, +S2.  absent: Bradycardia, 

Tachycardia, Gallop, Irregular Rhythm, JVD, RRR, Rubs, Systolic Murmur





- GI/Abdominal Exam


GI & Abdominal Exam: Normal Bowel Sounds, Unremarkable.  absent: Distended, 

Firm, Guarding, Rebound, Rigid, Soft, Tenderness


Additional comments: 





+ obese abdomen.





- Extremities Exam


Extremities exam: normal inspection





- Back Exam


Back exam: NORMAL INSPECTION





- Neurological Exam


Neurological exam: Alert, Oriented x3, Reflexes Normal





- Psychiatric Exam


Psychiatric exam: Normal Affect, Normal Mood





- Skin


Skin Exam: Dry, Intact, Normal Color, Warm





Discharge Plan





- Follow Up Plan


Condition: STABLE


Disposition: HOME/ ROUTINE


Patient education suggested?: Yes


Instructions:  Chest Pain (ED)


Additional Instructions: 


Please follow up with your primary care doctor and cardiologist in 1-2 weeks


Continue with your home medications


Your sugar was normal in the hospital without insulin, please hold insulin until

you see your primary care doctor or your endocrinologist as outpatient 


Please return if the symptoms returns or call 911.


Referrals: 


Lupillo Peraza MD [Staff Provider] - 


Carlos Duckworth MD [Staff Provider] - 





<Lupillo Peraza - Last Filed: 03/17/19 16:05>





Provider





- Provider


Date of Admission: 


03/16/19 05:59





Attending physician: 


Lupillo Peraza MD





Consults: 








03/16/19 06:00


Consult [Physician Consult] Routine 


   Comment: 


   Consulting Provider: Carlos Duckworth


   Consulting Physician: Carlos Duckworth


   Reason for Consult: chest pain





03/16/19 16:47


Inpatient NP Core Measures Referral Routine 


   Comment: 


   Physician Instructions: 


   Reason For Exam: EVALUATION


Transition In Care/Readmission Reduction Routine 


   Comment: 


   Physician Instructions: 


   Reason For Exam: EVALUATION





03/16/19 16:50


Social Work Referral Routine 


   Comment: DSICHARGE PLANNING TO HOME


   Physician Instructions: 


   Reason For Exam: EVALUATION














Hospital Course





- Lab Results


Lab Results: 


                                  Micro Results





03/16/19 05:30   Urine,Clean Catch   Urine Culture - Final


                            No Growth (<1,000 CFU/ML)





                             Most Recent Lab Values











WBC  7.9 10^3/uL (4.5-11.0)   03/16/19  04:10    


 


RBC  4.45 10^6/uL (3.5-6.1)   03/16/19  04:10    


 


Hgb  12.6 g/dL (12.0-16.0)   03/16/19  04:10    


 


Hct  39.0 % (36.0-48.0)   03/16/19  04:10    


 


MCV  87.6 fl (80.0-105.0)   03/16/19  04:10    


 


MCH  28.3 pg (25.0-35.0)   03/16/19  04:10    


 


MCHC  32.3 g/dl (31.0-37.0)   03/16/19  04:10    


 


RDW  15.8 % (11.5-14.5)  H  03/16/19  04:10    


 


Plt Count  237 10^3/uL (120.0-450.0)   03/16/19  04:10    


 


MPV  10.0 fl (7.0-11.0)   03/16/19  04:10    


 


Neut % (Auto)  51.1 % (50.0-68.0)   03/16/19  04:10    


 


Lymph % (Auto)  36.1 % (22.0-35.0)  H  03/16/19  04:10    


 


Mono % (Auto)  8.8 % (1.0-6.0)  H  03/16/19  04:10    


 


Eos % (Auto)  3.4 % (1.5-5.0)   03/16/19  04:10    


 


Baso % (Auto)  0.6 % (0.0-3.0)   03/16/19  04:10    


 


Lymph # (Auto)  2.9  (1.2-3.4)   03/16/19  04:10    


 


Mono # (Auto)  0.7  (0.1-0.6)  H  03/16/19  04:10    


 


Eos # (Auto)  0.3  (0.0-0.7)   03/16/19  04:10    


 


Baso # (Auto)  0.05 K/mm3 (0.0-2.0)   03/16/19  04:10    


 


Absolute Neuts (auto)  4.05  (1.4-6.5)   03/16/19  04:10    


 


Sodium  141 mmol/L (132-148)   03/16/19  04:10    


 


Potassium  3.9 mmol/L (3.6-5.0)   03/16/19  04:10    


 


Chloride  102 mmol/L ()   03/16/19  04:10    


 


Carbon Dioxide  29 mmol/L (21-33)   03/16/19  04:10    


 


Anion Gap  13  (10-20)   03/16/19  04:10    


 


BUN  18 mg/dL (7-21)   03/16/19  04:10    


 


Creatinine  0.8 mg/dl (0.7-1.2)   03/16/19  04:10    


 


Est GFR ( Amer)  > 60   03/16/19  04:10    


 


Est GFR (Non-Af Amer)  > 60   03/16/19  04:10    


 


POC Glucose (mg/dL)  105 mg/dL ()   03/16/19  07:35    


 


Random Glucose  89 mg/dL ()   03/16/19  04:10    


 


Hemoglobin A1c  5.8 % (4.2-6.5)   03/16/19  10:20    


 


Calcium  9.4 mg/dL (8.4-10.5)   03/16/19  04:10    


 


Magnesium  2.1 mg/dL (1.7-2.2)   03/16/19  04:10    


 


Total Bilirubin  0.6 mg/dL (0.2-1.3)   03/16/19  04:10    


 


AST  21 U/L (14-36)   03/16/19  04:10    


 


ALT  7 U/L (7-56)   03/16/19  04:10    


 


Alkaline Phosphatase  60 U/L ()   03/16/19  04:10    


 


Lactate Dehydrogenase  338 U/L (333-699)   03/16/19  04:10    


 


Total Creatine Kinase  39 U/L ()   03/16/19  04:10    


 


Troponin I  < 0.01 ng/mL  03/16/19  16:45    


 


Total Protein  7.1 g/dL (5.8-8.3)   03/16/19  04:10    


 


Albumin  4.0 g/dL (3.0-4.8)   03/16/19  04:10    


 


Globulin  3.0 gm/dL  03/16/19  04:10    


 


Albumin/Globulin Ratio  1.3  (1.1-1.8)   03/16/19  04:10    


 


Triglycerides  119 mg/dL ()   03/16/19  04:10    


 


Cholesterol  178 mg/dL (130-200)   03/16/19  04:10    


 


LDL Cholesterol Direct  115 mg/dL (0-129)   03/16/19  04:10    


 


HDL Cholesterol  34 mg/dL (29-60)   03/16/19  04:10    


 


TSH 3rd Generation  3.12 mIU/mL (0.46-4.68)   03/16/19  10:20    


 


Urine Color  Light yellow  (YELLOW)   03/16/19  04:20    


 


Urine Appearance  Clear  (CLEAR)   03/16/19  04:20    


 


Urine pH  7.5  (4.7-8.0)   03/16/19  04:20    


 


Ur Specific Gravity  1.010  (1.005-1.035)   03/16/19  04:20    


 


Urine Protein  Negative mg/dL (<30 mg/dL)   03/16/19  04:20    


 


Urine Glucose (UA)  Negative mg/dL (NEGATIVE)   03/16/19  04:20    


 


Urine Ketones  Negative mg/dL (NEGATIVE)   03/16/19  04:20    


 


Urine Blood  Negative  (NEGATIVE)   03/16/19  04:20    


 


Urine Nitrate  Negative  (NEGATIVE)   03/16/19  04:20    


 


Urine Bilirubin  Negative  (NEGATIVE)   03/16/19  04:20    


 


Urine Urobilinogen  0.2 E.U./dL (<1 E.U./dL)   03/16/19  04:20    


 


Ur Leukocyte Esterase  Trace Yomi/uL (NEGATIVE)  H  03/16/19  04:20    


 


Urine RBC  0 - 2 /hpf (0-2)   03/16/19  04:20    


 


Urine WBC  0 - 2 /hpf (0-6)   03/16/19  04:20    


 


Ur Epithelial Cells  0 - 2 /hpf (0-5)   03/16/19  04:20    


 


Urine Bacteria  Few /hpf (NONE)   03/16/19  04:20    














- Hospital Course


Hospital Course: 





Pt is seen and examined by me. I have reviewed the note of the medical resident 

and I agree with the note. I have discussed the asssessment and the plan with 

the resident. I have reviewed the medications and last labs.

## 2019-03-17 NOTE — DS
HOSPITAL COURSE:  The patient was seen and examined, I do agree with the

note of the medical resident.  The patient was complaining of chest pain,

it was atypical.  She had an ultrasound done that shows hepatic steatosis,

no significant abnormality.  She was seen by Dr. Duckworth and I did speak

to him.  The patient had a stress test about 1 year ago.  She had troponins

which were negative.  The patient is going to continue with Cymbalta for

anxiety.  She is on atorvastatin for _____ dyslipidemia.  She is currently

comfortable.  The patient was seen by me and examined, I do agree with the

note of the medical resident.  I was involved in the plan of care.







__________________________________________

Lupillo Peraza MD





DD:  03/16/2019 19:03:11

DT:  03/16/2019 21:49:33

Job # [id number]

## 2019-03-17 NOTE — DS
HOSPITAL COURSE:  The patient was seen and examined.  I do agree with the

note of medical resident.  Plan of care was discussed.  The patient had

troponin x3 which were negative.  She had an ultrasound which is also

normal.  She was seen by Cardiology and cleared for discharge.  She had a

stress test done about 1 year ago.  This is most likely reflux.  The

patient is going to follow up as an outpatient with me in the office within

1 week.  She has an appointment that was made for her for 03/21.



CONDITION:  Stable.



ACTIVITY:  Increase as tolerated.







__________________________________________

Lupillo Peraza MD





DD:  03/17/2019 16:05:34

DT:  03/17/2019 17:24:12

Job # [id number]

## 2019-03-18 NOTE — PN
DATE:  03/17/2019



SUBJECTIVE:  The patient is seen, sitting in bed, on telemetry.  She has

had no further chest or epigastric discomfort.  Cardiac enzymes are

negative.  Abdominal ultrasound reveals fatty liver with no evidence of

additional abnormalities.



PHYSICAL EXAMINATION:

GENERAL:  She is a middle-aged woman who appears comfortable at the present

time.

VITAL SIGNS:  Blood pressure is 116/76 with pulse of 80 and sinus

respirations of 14.  She is afebrile.

HEENT:  No JVD.

CHEST:  Clear to auscultation and percussion.

HEART:  PMI in normal position.  No pathological gallops noted.

ABDOMEN:  Soft, nontender with no active bowel sounds.

EXTREMITIES:  No edema.



DIAGNOSTIC DATA:  All cardiac enzymes are negative.



IMPRESSION:  Chest and epigastric discomfort, etiology unclear.  This

appears fairly atypical for cardiac disease.  Did have a stress test last

summer, which showed no abnormalities.  The rest of problems as noted.



RECOMMENDATIONS:  From cardiac standpoint, discharge home at this time

would be appropriate.  Outpatient followup will be arranged as needed if

she has recurrent symptoms.





__________________________________________

Carlos Duckworth MD





DD:  03/17/2019 5:53:18

DT:  03/17/2019 5:54:36

Job # [id number]

## 2019-03-18 NOTE — CARD
--------------- APPROVED REPORT --------------





Date of service: 03/16/2019



EKG Measurement

Heart [id number]

AL [id number]

[id number]

[id number]

[id number]



<Conclusion>

Sinus rhythm with occasional premature ventricular complexes

Otherwise normal ECG

## 2022-07-18 ENCOUNTER — ESTABLISHED (OUTPATIENT)
Dept: URBAN - METROPOLITAN AREA CLINIC 21 | Facility: CLINIC | Age: 72
End: 2022-07-18

## 2022-07-18 DIAGNOSIS — H16.223: ICD-10-CM

## 2022-07-18 DIAGNOSIS — H35.373: ICD-10-CM

## 2022-07-18 DIAGNOSIS — H52.13: ICD-10-CM

## 2022-07-18 DIAGNOSIS — Z96.1: ICD-10-CM

## 2022-07-18 PROCEDURE — 92015 DETERMINE REFRACTIVE STATE: CPT | Mod: GY

## 2022-07-18 PROCEDURE — 92134 CPTRZ OPH DX IMG PST SGM RTA: CPT

## 2022-07-18 PROCEDURE — 92014 COMPRE OPH EXAM EST PT 1/>: CPT

## 2022-07-18 ASSESSMENT — VISUAL ACUITY
OD_SC: 20/50-3
OS_SC: 20/100
OD_PH: 20/50+2
OS_PH: 20/60+3
OU_SC: J3

## 2022-07-18 ASSESSMENT — TONOMETRY
OS_IOP_MMHG: 12
OD_IOP_MMHG: 12

## 2023-05-08 ENCOUNTER — EMERGENCY VISIT (OUTPATIENT)
Dept: URBAN - METROPOLITAN AREA CLINIC 21 | Facility: CLINIC | Age: 73
End: 2023-05-08

## 2023-05-08 DIAGNOSIS — H10.13: ICD-10-CM

## 2023-05-08 PROCEDURE — 92012 INTRM OPH EXAM EST PATIENT: CPT

## 2023-05-08 ASSESSMENT — VISUAL ACUITY
OD_SC: 20/50
OS_SC: 20/60

## 2023-05-08 ASSESSMENT — TONOMETRY
OD_IOP_MMHG: 8
OS_IOP_MMHG: 8

## 2023-09-07 ENCOUNTER — ESTABLISHED COMPREHENSIVE EXAM (OUTPATIENT)
Dept: URBAN - METROPOLITAN AREA CLINIC 21 | Facility: CLINIC | Age: 73
End: 2023-09-07

## 2023-09-07 DIAGNOSIS — Z96.1: ICD-10-CM

## 2023-09-07 DIAGNOSIS — E11.9: ICD-10-CM

## 2023-09-07 DIAGNOSIS — H10.13: ICD-10-CM

## 2023-09-07 DIAGNOSIS — H35.373: ICD-10-CM

## 2023-09-07 PROCEDURE — 92014 COMPRE OPH EXAM EST PT 1/>: CPT

## 2023-09-07 PROCEDURE — 92250 FUNDUS PHOTOGRAPHY W/I&R: CPT

## 2023-09-07 ASSESSMENT — VISUAL ACUITY
OU_CC: J1
OS_CC: 20/50
OD_CC: 20/40-

## 2023-09-07 ASSESSMENT — TONOMETRY
OD_IOP_MMHG: 8
OS_IOP_MMHG: 9

## 2023-11-03 ENCOUNTER — EMERGENCY VISIT (OUTPATIENT)
Dept: URBAN - METROPOLITAN AREA CLINIC 21 | Facility: CLINIC | Age: 73
End: 2023-11-03

## 2023-11-03 DIAGNOSIS — S05.01XA: ICD-10-CM

## 2023-11-03 PROCEDURE — 92012 INTRM OPH EXAM EST PATIENT: CPT

## 2023-11-03 ASSESSMENT — VISUAL ACUITY
OD_SC: 20/50-2
OS_SC: 20/70+3

## 2023-11-03 ASSESSMENT — TONOMETRY
OS_IOP_MMHG: 7
OD_IOP_MMHG: 6

## 2023-11-08 ENCOUNTER — FOLLOW UP (OUTPATIENT)
Dept: URBAN - METROPOLITAN AREA CLINIC 21 | Facility: CLINIC | Age: 73
End: 2023-11-08

## 2023-11-08 DIAGNOSIS — S05.01XD: ICD-10-CM

## 2023-11-08 PROCEDURE — 92012 INTRM OPH EXAM EST PATIENT: CPT

## 2023-11-08 ASSESSMENT — TONOMETRY
OS_IOP_MMHG: 7
OD_IOP_MMHG: 8

## 2023-11-08 ASSESSMENT — VISUAL ACUITY
OD_SC: 20/50+2
OS_SC: 20/70

## 2024-07-08 ENCOUNTER — FOLLOW UP (OUTPATIENT)
Dept: URBAN - METROPOLITAN AREA CLINIC 21 | Facility: CLINIC | Age: 74
End: 2024-07-08

## 2024-07-08 DIAGNOSIS — H10.13: ICD-10-CM

## 2024-07-08 DIAGNOSIS — H16.223: ICD-10-CM

## 2024-07-08 DIAGNOSIS — H35.373: ICD-10-CM

## 2024-07-08 PROCEDURE — 92134 CPTRZ OPH DX IMG PST SGM RTA: CPT

## 2024-07-08 PROCEDURE — 92012 INTRM OPH EXAM EST PATIENT: CPT

## 2024-07-08 ASSESSMENT — VISUAL ACUITY
OS_SC: 20/70-1
OD_SC: 20/50

## 2024-07-08 ASSESSMENT — TONOMETRY
OS_IOP_MMHG: 8
OD_IOP_MMHG: 8

## (undated) RX ORDER — ERYTHROMYCIN 5 MG/G
1/4 OINTMENT OPHTHALMIC
Start: 2023-11-03

## (undated) RX ORDER — AZELASTINE HYDROCHLORIDE 0.5 MG/ML
1 SOLUTION/ DROPS INTRAOCULAR TWICE A DAY
Start: 2023-05-08